# Patient Record
Sex: MALE | Race: WHITE | URBAN - METROPOLITAN AREA
[De-identification: names, ages, dates, MRNs, and addresses within clinical notes are randomized per-mention and may not be internally consistent; named-entity substitution may affect disease eponyms.]

---

## 2017-10-31 ENCOUNTER — APPOINTMENT (OUTPATIENT)
Dept: CT IMAGING | Facility: CLINIC | Age: 82
End: 2017-10-31
Attending: EMERGENCY MEDICINE
Payer: MEDICARE

## 2017-10-31 ENCOUNTER — HOSPITAL ENCOUNTER (OUTPATIENT)
Facility: CLINIC | Age: 82
Setting detail: OBSERVATION
Discharge: HOME | End: 2017-11-01
Attending: EMERGENCY MEDICINE | Admitting: INTERNAL MEDICINE
Payer: MEDICARE

## 2017-10-31 DIAGNOSIS — I48.91 ATRIAL FIBRILLATION WITH RVR (H): ICD-10-CM

## 2017-10-31 DIAGNOSIS — R55 SYNCOPE, UNSPECIFIED SYNCOPE TYPE: ICD-10-CM

## 2017-10-31 LAB
ALBUMIN SERPL-MCNC: 3.8 G/DL (ref 3.4–5)
ALP SERPL-CCNC: 62 U/L (ref 40–150)
ALT SERPL W P-5'-P-CCNC: 20 U/L (ref 0–70)
ANION GAP SERPL CALCULATED.3IONS-SCNC: 6 MMOL/L (ref 3–14)
AST SERPL W P-5'-P-CCNC: 18 U/L (ref 0–45)
BASOPHILS # BLD AUTO: 0.1 10E9/L (ref 0–0.2)
BASOPHILS NFR BLD AUTO: 0.9 %
BILIRUB SERPL-MCNC: 0.6 MG/DL (ref 0.2–1.3)
BUN SERPL-MCNC: 15 MG/DL (ref 7–30)
CALCIUM SERPL-MCNC: 8.6 MG/DL (ref 8.5–10.1)
CHLORIDE SERPL-SCNC: 96 MMOL/L (ref 94–109)
CO2 SERPL-SCNC: 29 MMOL/L (ref 20–32)
CREAT SERPL-MCNC: 0.97 MG/DL (ref 0.66–1.25)
DIFFERENTIAL METHOD BLD: NORMAL
EOSINOPHIL # BLD AUTO: 0.2 10E9/L (ref 0–0.7)
EOSINOPHIL NFR BLD AUTO: 2.2 %
ERYTHROCYTE [DISTWIDTH] IN BLOOD BY AUTOMATED COUNT: 13.4 % (ref 10–15)
GFR SERPL CREATININE-BSD FRML MDRD: 74 ML/MIN/1.7M2
GLUCOSE SERPL-MCNC: 113 MG/DL (ref 70–99)
HCT VFR BLD AUTO: 44.4 % (ref 40–53)
HGB BLD-MCNC: 15.1 G/DL (ref 13.3–17.7)
IMM GRANULOCYTES # BLD: 0.1 10E9/L (ref 0–0.4)
IMM GRANULOCYTES NFR BLD: 0.6 %
LYMPHOCYTES # BLD AUTO: 1.5 10E9/L (ref 0.8–5.3)
LYMPHOCYTES NFR BLD AUTO: 19.5 %
MAGNESIUM SERPL-MCNC: 2.5 MG/DL (ref 1.6–2.3)
MCH RBC QN AUTO: 32 PG (ref 26.5–33)
MCHC RBC AUTO-ENTMCNC: 34 G/DL (ref 31.5–36.5)
MCV RBC AUTO: 94 FL (ref 78–100)
MONOCYTES # BLD AUTO: 0.9 10E9/L (ref 0–1.3)
MONOCYTES NFR BLD AUTO: 11 %
NEUTROPHILS # BLD AUTO: 5.1 10E9/L (ref 1.6–8.3)
NEUTROPHILS NFR BLD AUTO: 65.8 %
NRBC # BLD AUTO: 0 10*3/UL
NRBC BLD AUTO-RTO: 0 /100
PLATELET # BLD AUTO: 176 10E9/L (ref 150–450)
POTASSIUM SERPL-SCNC: 4.1 MMOL/L (ref 3.4–5.3)
PROT SERPL-MCNC: 8 G/DL (ref 6.8–8.8)
RBC # BLD AUTO: 4.72 10E12/L (ref 4.4–5.9)
SODIUM SERPL-SCNC: 131 MMOL/L (ref 133–144)
TROPONIN I SERPL-MCNC: 0.04 UG/L (ref 0–0.04)
WBC # BLD AUTO: 7.7 10E9/L (ref 4–11)

## 2017-10-31 PROCEDURE — 84484 ASSAY OF TROPONIN QUANT: CPT | Performed by: EMERGENCY MEDICINE

## 2017-10-31 PROCEDURE — 96361 HYDRATE IV INFUSION ADD-ON: CPT

## 2017-10-31 PROCEDURE — 93005 ELECTROCARDIOGRAM TRACING: CPT

## 2017-10-31 PROCEDURE — 85025 COMPLETE CBC W/AUTO DIFF WBC: CPT | Performed by: EMERGENCY MEDICINE

## 2017-10-31 PROCEDURE — 25000128 H RX IP 250 OP 636: Performed by: EMERGENCY MEDICINE

## 2017-10-31 PROCEDURE — 80053 COMPREHEN METABOLIC PANEL: CPT | Performed by: EMERGENCY MEDICINE

## 2017-10-31 PROCEDURE — 70450 CT HEAD/BRAIN W/O DYE: CPT

## 2017-10-31 PROCEDURE — 25000128 H RX IP 250 OP 636: Performed by: INTERNAL MEDICINE

## 2017-10-31 PROCEDURE — 96360 HYDRATION IV INFUSION INIT: CPT

## 2017-10-31 PROCEDURE — 12000007 ZZH R&B INTERMEDIATE

## 2017-10-31 PROCEDURE — 83735 ASSAY OF MAGNESIUM: CPT | Performed by: EMERGENCY MEDICINE

## 2017-10-31 PROCEDURE — 99285 EMERGENCY DEPT VISIT HI MDM: CPT | Mod: 25

## 2017-10-31 RX ORDER — AMOXICILLIN 250 MG
2 CAPSULE ORAL 2 TIMES DAILY PRN
Status: DISCONTINUED | OUTPATIENT
Start: 2017-10-31 | End: 2017-11-01 | Stop reason: HOSPADM

## 2017-10-31 RX ORDER — SODIUM CHLORIDE 9 MG/ML
INJECTION, SOLUTION INTRAVENOUS CONTINUOUS
Status: DISCONTINUED | OUTPATIENT
Start: 2017-10-31 | End: 2017-11-01 | Stop reason: HOSPADM

## 2017-10-31 RX ORDER — ONDANSETRON 2 MG/ML
4 INJECTION INTRAMUSCULAR; INTRAVENOUS EVERY 6 HOURS PRN
Status: DISCONTINUED | OUTPATIENT
Start: 2017-10-31 | End: 2017-11-01 | Stop reason: HOSPADM

## 2017-10-31 RX ORDER — AMOXICILLIN 250 MG
1 CAPSULE ORAL 2 TIMES DAILY PRN
Status: DISCONTINUED | OUTPATIENT
Start: 2017-10-31 | End: 2017-11-01 | Stop reason: HOSPADM

## 2017-10-31 RX ORDER — ONDANSETRON 4 MG/1
4 TABLET, ORALLY DISINTEGRATING ORAL EVERY 6 HOURS PRN
Status: DISCONTINUED | OUTPATIENT
Start: 2017-10-31 | End: 2017-11-01 | Stop reason: HOSPADM

## 2017-10-31 RX ORDER — ACETAMINOPHEN 325 MG/1
650 TABLET ORAL EVERY 4 HOURS PRN
Status: DISCONTINUED | OUTPATIENT
Start: 2017-10-31 | End: 2017-11-01 | Stop reason: HOSPADM

## 2017-10-31 RX ORDER — NALOXONE HYDROCHLORIDE 0.4 MG/ML
.1-.4 INJECTION, SOLUTION INTRAMUSCULAR; INTRAVENOUS; SUBCUTANEOUS
Status: DISCONTINUED | OUTPATIENT
Start: 2017-10-31 | End: 2017-11-01 | Stop reason: HOSPADM

## 2017-10-31 RX ADMIN — SODIUM CHLORIDE 500 ML: 9 INJECTION, SOLUTION INTRAVENOUS at 21:00

## 2017-10-31 RX ADMIN — SODIUM CHLORIDE: 9 INJECTION, SOLUTION INTRAVENOUS at 23:00

## 2017-10-31 ASSESSMENT — ACTIVITIES OF DAILY LIVING (ADL)
RETIRED_COMMUNICATION: 0-->UNDERSTANDS/COMMUNICATES WITHOUT DIFFICULTY
RETIRED_EATING: 0-->INDEPENDENT
COGNITION: 0 - NO COGNITION ISSUES REPORTED
TRANSFERRING: 0-->INDEPENDENT
FALL_HISTORY_WITHIN_LAST_SIX_MONTHS: YES
SWALLOWING: 0-->SWALLOWS FOODS/LIQUIDS WITHOUT DIFFICULTY
AMBULATION: 0-->INDEPENDENT
NUMBER_OF_TIMES_PATIENT_HAS_FALLEN_WITHIN_LAST_SIX_MONTHS: 1
BATHING: 0-->INDEPENDENT
TOILETING: 0-->INDEPENDENT
DRESS: 0-->INDEPENDENT
WHICH_OF_THE_ABOVE_FUNCTIONAL_RISKS_HAD_A_RECENT_ONSET_OR_CHANGE?: AMBULATION

## 2017-10-31 ASSESSMENT — ENCOUNTER SYMPTOMS
SEIZURES: 0
SHORTNESS OF BREATH: 0
DIAPHORESIS: 1
PALPITATIONS: 0

## 2017-10-31 NOTE — IP AVS SNAPSHOT
MRN:[id number]                      After Visit Summary   10/31/2017    Sharath Valencia    MRN: [id number]           Thank you!     Thank you for choosing [facility] for your care. Our goal is always to provide you with excellent care. Hearing back from our patients is one way we can continue to improve our services. Please take a few minutes to complete the written survey that you may receive in the mail after you visit. If you would like to speak to someone directly about your visit please contact Patient Relations at [phone]. Thank you!          Patient Information     Date Of Birth          6/29/1932        Designated Caregiver       Most Recent Value    Caregiver    Will someone help with your care after discharge? yes    Name of designated caregiver brett    Phone number of caregiver [phone]    Caregiver address [place]      About your hospital stay     You were admitted on:  October 31, 2017 You last received care in the:  [place] [address] [facility]    You were discharged on:  November 1, 2017       Who to Call     For medical emergencies, please call 911.  For non-urgent questions about your medical care, please call your primary care provider or clinic, None          Attending Provider     Provider Specialty    Sepideh Arias MD Emergency Medicine    HerJoe MD Internal Medicine       Primary Care Provider Fax #    [facility] [phone]      After Care Instructions     Activity       Your activity upon discharge: activity as tolerated            Diet       Follow this diet upon discharge: regular                  Follow-up Appointments     Follow-up and recommended labs and tests        See primary MD on Friday, 11/3/17, and check INR level.  Recommend to check INR twice a week for the next 2 weeks as your Coumadin dose is adjusted.  Goal INR is 2-3.  Obtain further refills of Coumadin through your primary MD - you are only  "being given a small amount for now as I anticipate your dose will likely change    A Zio Patch has been ordered on discharge to monitor your heart rhythm for the next several weeks after discharge from the hospital.  After this is completed follow up with your primary MD to obtain the results on this                  Warfarin Instruction     You have started taking a medicine called warfarin. This is a blood-thinning medicine (anticoagulant). It helps prevent and treat blood clots.      Before leaving the hospital, make sure you know how much to take and how long to take it.      You will need regular blood tests to make sure your blood is clotting safely. It is very important to see your doctor for regular blood tests.    Talk to your doctor before taking any new medicine (this includes over-the-counter drugs and herbal products). Many medicines can interact with warfarin. This may cause more bleeding or too much clotting.     Eating a lot of vitamin K--found in green, leafy vegetables--can change the way warfarin works in your body. Do NOT avoid these foods. Instead, try to eat the same amount each day.     Bleeding is the most common side-effect of warfarin. You may notice bleeding gums, a bloody nose, bruises and bleeding longer when you cut yourself. See a doctor at once if:   o You cough up blood  o You find blood in your stool (poop)  o You have a deep cut, or a cut that bleeds longer than 10 minutes   o You have a bad cut, hard fall, accident or hit your head (go to urgent care or the emergency room).    For women who can get pregnant: This medicine can harm an unborn baby. Be very careful not to get pregnant while taking this medicine. If you think you might be pregnant, call your doctor right away.    For more information, read \"Guide to Warfarin Therapy,  the booklet you received in the hospital.        Pending Results     Date and Time Order Name Status Description    11/1/2017 1519 Zio Patch Holter In " "process             Statement of Approval     Ordered          17 1529  I have reviewed and agree with all the recommendations and orders detailed in this document.  EFFECTIVE NOW     Approved and electronically signed by:  Barrie Workman MD             Admission Information     Date & Time Provider Department Dept. Phone    10/31/2017 Joe Ramirez MD [place] [address] [facility] [phone]      Your Vitals Were     Blood Pressure Pulse Temperature Respirations Height Weight    148/83 74 95.5  F (35.3  C) (Axillary) 18 1.727 m (5' 8\") 68.9 kg (151 lb 14.4 oz)    Pulse Oximetry BMI (Body Mass Index)                100% 23.1 kg/m2          MyChart Information     [facility] lets you send messages to your doctor, view your test results, renew your prescriptions, schedule appointments and more. To sign up, go to [email or web address] . Click on \"Log in\" on the left side of the screen, which will take you to the Welcome page. Then click on \"Sign up Now\" on the right side of the page.     You will be asked to enter the access code listed below, as well as some personal information. Please follow the directions to create your username and password.     Your access code is: [email or web address]  Expires: 2018  3:36 PM     Your access code will  in 90 days. If you need help or a new code, please call your [facility] clinic or [phone].        Care EveryWhere ID     This is your Care EveryWhere ID. This could be used by other organizations to access your [facility] medical records  [id number]        Equal Access to Services     [facility]: Hadii sue todd hadmathew [place], waaxda luqadaha, qaybta kaalmada lara vences . So [facility] [phone].    ATENCIÓN: Si habla español, tiene a bills disposición servicios gratuitos de asistencia lingüística. Llame al [phone].    We comply with applicable federal civil rights laws and [place] laws. We do not discriminate on " the basis of race, color, national origin, age, disability, sex, sexual orientation, or gender identity.               Review of your medicines      START taking        Dose / Directions    metoprolol 25 MG tablet   Commonly known as:  LOPRESSOR   Used for:  Atrial fibrillation with RVR (H)        Dose:  12.5 mg   Take 0.5 tablets (12.5 mg) by mouth 2 times daily   Quantity:  60 tablet   Refills:  1       warfarin 5 MG tablet   Commonly known as:  COUMADIN   Used for:  Atrial fibrillation with RVR (H)        Dose:  5 mg   Take 1 tablet (5 mg) by mouth daily   Quantity:  4 tablet   Refills:  0            Where to get your medicines      These medications were sent to [place], [place] - [address] [facility]  [address] [facility] [address]     Phone:  [phone]     metoprolol 25 MG tablet    warfarin 5 MG tablet                Protect others around you: Learn how to safely use, store and throw away your medicines at www.disposemymeds.org.             Medication List: This is a list of all your medications and when to take them. Check marks below indicate your daily home schedule. Keep this list as a reference.      Medications           Morning Afternoon Evening Bedtime As Needed    metoprolol 25 MG tablet   Commonly known as:  LOPRESSOR   Take 0.5 tablets (12.5 mg) by mouth 2 times daily   Next Dose Due:  Tonight 11/1/2017                                      warfarin 5 MG tablet   Commonly known as:  COUMADIN   Take 1 tablet (5 mg) by mouth daily   Next Dose Due:  Today 11/1/2017                                             More Information        Possible Causes of Dizziness or Fainting    Dizziness and fainting can have many causes. Below are some examples of possible causes your healthcare provider will look to rule out.  Benign paroxysmal positional vertigo (BPPV)  BPPV results when calcium crystals inside the inner ear shift into the wrong position. BPPV causes  episodes of vertigo (a spinning sensation). Episodes most often happen when the head is moved in a certain way. This is more common in people 65 and older.   Infection or inflammation  The semicircular canals of the ear may become infected or inflamed. In this case, they can send the wrong balance signals. This can cause vertigo.  Meniere disease  Meniere disease happens when there is too much fluid in the semicircular canals. This can cause vertigo. It also can cause hearing problems and buzzing or ringing in the ears (called tinnitus). You may also have a feeling of pressure or fullness in the ear.  Syncope  Syncope is fainting that happens when the brain doesn t get enough oxygen-rich blood. It can be caused by low heart rate or low blood pressure. This is called vasovagal syncope. It can also be caused by sitting or standing up too quickly. This is called orthostatic hypotension. Syncope may also be due to a heart valve problem, an abnormal heart rhythm, or other heart problems. Dizziness can also happen from stroke, hemorrhage in the brain, or other problems in the brain. Your healthcare provider may do certain tests to rule out these conditions.  Other causes  Other causes include:    Medicines. Certain medicines can cause dizziness and even fainting. In some cases, stopping a medicine too quickly can lead to withdrawal symptoms, including dizziness and fainting.    Anxiety. Being anxious can lead to breathing changes, such as hyperventilation. These can lead to dizziness and fainting.  Additional causes for dizziness and fainting also exist. Talk to your healthcare provider for more information.     Date Last Reviewed: 10/6/2015    [phone] [employer]. [address] [place] [address], [place] [address]. All rights reserved. This information is not intended as a substitute for professional medical care. Always follow your healthcare professional's instructions.

## 2017-10-31 NOTE — IP AVS SNAPSHOT
[place] [address] [facility]    [address]    [facility] [address]    Phone:  [phone]    Fax:  [phone]                                       After Visit Summary   10/31/2017    Sharath Valencia    MRN: [id number]           After Visit Summary Signature Page     I have received my discharge instructions, and my questions have been answered. I have discussed any challenges I see with this plan with the nurse or doctor.    ..........................................................................................................................................  Patient/Patient Representative Signature      ..........................................................................................................................................  Patient Representative Print Name and Relationship to Patient    ..................................................               ................................................  Date                                            Time    ..........................................................................................................................................  Reviewed by Signature/Title    ...................................................              ..............................................  Date                                                            Time

## 2017-11-01 ENCOUNTER — APPOINTMENT (OUTPATIENT)
Dept: CARDIOLOGY | Facility: CLINIC | Age: 82
End: 2017-11-01
Attending: INTERNAL MEDICINE
Payer: MEDICARE

## 2017-11-01 VITALS
BODY MASS INDEX: 23.02 KG/M2 | SYSTOLIC BLOOD PRESSURE: 148 MMHG | WEIGHT: 151.9 LBS | OXYGEN SATURATION: 100 % | DIASTOLIC BLOOD PRESSURE: 83 MMHG | RESPIRATION RATE: 18 BRPM | HEART RATE: 74 BPM | HEIGHT: 68 IN | TEMPERATURE: 95.5 F

## 2017-11-01 LAB
INR PPP: 1.08 (ref 0.86–1.14)
INTERPRETATION ECG - MUSE: NORMAL

## 2017-11-01 PROCEDURE — 0296T ZIO PATCH HOLTER: CPT | Performed by: INTERNAL MEDICINE

## 2017-11-01 PROCEDURE — 36415 COLL VENOUS BLD VENIPUNCTURE: CPT | Performed by: INTERNAL MEDICINE

## 2017-11-01 PROCEDURE — 93306 TTE W/DOPPLER COMPLETE: CPT | Mod: 26 | Performed by: INTERNAL MEDICINE

## 2017-11-01 PROCEDURE — 99207 ZZC CDG-MDM COMPONENT: MEETS MODERATE - UP CODED: CPT | Performed by: INTERNAL MEDICINE

## 2017-11-01 PROCEDURE — G0378 HOSPITAL OBSERVATION PER HR: HCPCS

## 2017-11-01 PROCEDURE — 99220 ZZC INITIAL OBSERVATION CARE,LEVL III: CPT | Performed by: INTERNAL MEDICINE

## 2017-11-01 PROCEDURE — 25000128 H RX IP 250 OP 636: Performed by: INTERNAL MEDICINE

## 2017-11-01 PROCEDURE — 93306 TTE W/DOPPLER COMPLETE: CPT

## 2017-11-01 PROCEDURE — 85610 PROTHROMBIN TIME: CPT | Performed by: INTERNAL MEDICINE

## 2017-11-01 PROCEDURE — 0298T ZZC EXT ECG > 48HR TO 21 DAY REVIEW AND INTERPRETATN: CPT | Performed by: INTERNAL MEDICINE

## 2017-11-01 RX ORDER — WARFARIN SODIUM 5 MG/1
5 TABLET ORAL DAILY
Qty: 4 TABLET | Refills: 0 | Status: SHIPPED | OUTPATIENT
Start: 2017-11-01 | End: 2019-02-04

## 2017-11-01 RX ORDER — METOPROLOL TARTRATE 25 MG/1
12.5 TABLET, FILM COATED ORAL 2 TIMES DAILY
Qty: 60 TABLET | Refills: 1 | Status: SHIPPED | OUTPATIENT
Start: 2017-11-01 | End: 2019-02-04

## 2017-11-01 RX ADMIN — SODIUM CHLORIDE: 9 INJECTION, SOLUTION INTRAVENOUS at 11:48

## 2017-11-01 ASSESSMENT — ACTIVITIES OF DAILY LIVING (ADL): ADLS_ACUITY_SCORE: 11

## 2017-11-01 NOTE — PLAN OF CARE
Problem: Patient Care Overview  Goal: Plan of Care/Patient Progress Review  Outcome: Improving  PRIMARY DIAGNOSIS: SYNCOPE  OUTPATIENT/OBSERVATION GOALS TO BE MET BEFORE DISCHARGE:  1. Orthostatic performed: Yes:          Lying Orthostatic BP: 104/66         Sitting Orthostatic BP: 114/78         Standing Orthostatic BP: 100/65      2. Diagnostic testing complete & at baseline neurologic testing: Yes; ECHO completed     3. Cleared by consultants (if involved): N/A     4. Interpretation of cardiac rhythm per telemetry tech: a-fib with CVR     5. Tolerating adequate PO diet and medications: Yes     6. Return to near baseline physical activity or neurologic status: Yes     Vital signs stable. Denies any pain. Denies any nausea. Heart monitor continues to show a-fib. Asymptomatic thus far today. Up with SBA. Ambulated in halls x1.     Discharge Planner Nurse   Safe discharge environment identified: Yes  Barriers to discharge: Yes; No D/C orders yet       Entered by: Caden Brown 11/01/2017 12:06 PM     Please review provider order for any additional goals.   Nurse to notify provider when observation goals have been met and patient is ready for discharge.

## 2017-11-01 NOTE — PHARMACY - DISCHARGE MEDICATION RECONCILIATION AND EDUCATION
Discharge medication review for this patient is complete. Face-to-face medication education was provided by the pharmacist.  See [facility] for allergy information and immunization status.   Pharmacist assisted with medication reconciliation of discharge medications with PTA medications.    Discussed with Dr. Workman about bridging warfarin with Lovenox and Dr. Workman informed pharmacist that no bridging was necessary.     Patient was informed to STOP taking the following HOME medications:   none    Patient was informed to START taking the following NEW medications:   Metoprolol and warfarin    Patient was informed to make the following changes to prior to admission medications:  none    Patient was educated on the following for each discharge medication:   Rationale for therapy  Duration of treatment  Dosing and or monitoring drug levels  Common side effects  Importance of compliance  Drug/food interactions  Missed doses  Self monitoring parameters    Left written materials and instructions (Clinical notes from [facility]) for new medications: Yes    OUTCOMES: Patient verbalized understanding    IMPORTANT FOLLOW UP NOTES:   - INR check on Friday November 3rd.     Discharge Medication List     Review of your medicines      START taking       Dose / Directions    metoprolol 25 MG tablet   Commonly known as:  LOPRESSOR   Used for:  Atrial fibrillation with RVR (H)        Dose:  12.5 mg   Take 0.5 tablets (12.5 mg) by mouth 2 times daily   Quantity:  60 tablet   Refills:  1       warfarin 5 MG tablet   Commonly known as:  COUMADIN   Used for:  Atrial fibrillation with RVR (H)        Dose:  5 mg   Take 1 tablet (5 mg) by mouth daily   Quantity:  4 tablet   Refills:  0            Where to get your medicines      These medications were sent to [facility] [place], [place] - [address] [facility]  [address] [facility] [address]     Phone:  [phone]      metoprolol 25 MG tablet     warfarin 5 MG  tablet

## 2017-11-01 NOTE — PROGRESS NOTES
Pt feels well.  Wants to go home today      Last 24 hours Vitals signs,imaging,microbiology;laboratory results were reviewed by me in EPIC  GENERAL:  Comfortable.  PSYCH: pleasant, oriented, No acute distress.  HEART:  irreg with no edema.  LUNGS:  Clear to auscultation, normal Respiratory effort.  ABDOMEN:  Soft, no hepatosplenomegaly, normal bowel sounds.  SKIN:  Dry to touch, No rash.     Last Basic Metabolic Panel:  Lab Results   Component Value Date     10/31/2017      Lab Results   Component Value Date    POTASSIUM 4.1 10/31/2017     Lab Results   Component Value Date    CHLORIDE 96 10/31/2017     Lab Results   Component Value Date    CUCA 8.6 10/31/2017     Lab Results   Component Value Date    CO2 29 10/31/2017     Lab Results   Component Value Date    BUN 15 10/31/2017     Lab Results   Component Value Date    CR 0.97 10/31/2017     Lab Results   Component Value Date     10/31/2017          A/p:  afib - home today with metoprolol and warfarin.  Needs f/u with primary MD on Friday for INR check.  Zio patch on discharge.  Coumadin started.  At this point I think he can discharge on Coumadin without need for bridging Lovenox given  [id number] score of 2 he would be at low risk for a cardioembolic event to occur prior to INR likely becoming therapeutic within the next 5-7 days

## 2017-11-01 NOTE — H&P
[facility]  Hospitalist Admission Note  November 1, 2017  Name: Sharath Valencia    MRN: [id number]  YOB: 1932    Age: 85 year old  Date of admission: 10/31/2017  [facility] provider[facility]      Summary:  Sharath Valencia is an 85 year old male with no pmh who presents to the emergency department today accompanied by family for a syncopal event and was found to be in afib with rvr which is a new dx.    Problem list  1. Syncopal event: concerned that this is cardiogenic with no prodrome and new dx of afib with rvr. No neuro findings and back to baseline.  2. Afib with rvr, new dx: back in nsr with hr in the 70s. CHADS-Vasc 2 score is only significant for age  3. Mild Hyponatremia: appears to be chronic per family    Plan:    Observation Admission to telemetry for now    Will check an echo to look for underlying structural heart ds that could explain his syncopal event and/or increase his CHADS score    Consider a holter or event monitor prior to discharge    Depending on echo findings, can discuss strategy of stroke prophylaxis    -Additional workup plan which will include echo    All lab work and imaging data independently reviewed by myself    Prophylaxis;  Low risk/Ambulation.   Discharge: home when able    Chief Complaint:   Syncopal event   HPI  Sharath Valencia is an 85 year old male with no pmh who presents to the emergency department today accompanied by family for evaluation of loss of consciousness. At 1900 this evening the patient was standing and drying dishes when he had an unwitnessed loss of consciousness. The patient's family then found the patient in a seated position on the floor with his back against a wall. When they tried to help the patient back to his feet, he again lost consciousness for approximately ten seconds. While the patient was unconscious this second time, the family reports that his eyes rolled  "back into his head but he did not shake or have any seizure-like symptoms. With these two episodes, however, the patient's family decided to bring him here to the emergency department. Here, the patient reports that he did not have any prodromal symptoms prior to either syncopal episode but the family reports that he was diaphoretic after these episodes. He also reports that he does not think that he hit his head during the first fall and the family corroborates this as they believe that he simply slumped back against the wall. The patient denies any chest pain, shortness of breath, or palpitations prior to passing out and he currently denies any pain, recent sicknesses, or history of atrial fibrillation. The family reports that the patient's sodium runs a little low.  I did discuss the case in detail with the ED physician. In the ED, pt was noted to be in afib with rvr in the low 100-110s. No intervention other than ivf was ordered. Currently pt has no c/o and feels back at baseline.     Past Medical History:   History reviewed. No pertinent past medical history.  Past Surgical History:     Past Surgical History:   Procedure Laterality Date     BACK SURGERY      Laminectomy      HERNIA REPAIR       Social History:     Social History   Substance Use Topics     Smoking status: Former Smoker     Smokeless tobacco: Former User     Quit date: 10/31/1989     Alcohol use Not on file     Family History:  Family history reviewed. NO pertinent family history     Allergies:   Not on File  Medications:     No prescriptions prior to admission.       Review of Systems:   A Comprehensive greater than 10 system review of systems was carried out.  Pertinent positives and negatives are noted above.  Otherwise negative for contributory information.        Physical Exam:  Blood pressure 148/75, pulse 74, temperature 97.2  F (36.2  C), temperature source Oral, resp. rate 18, height 1.727 m (5' 8\"), weight 68.9 kg (151 lb 14.4 oz), SpO2 " 97 %.  Gen: Pleasant in no acute distress.  HEENT: NCAT. EOMI. PERRL.  Neck: Normal inspection. No bruit, JVD or thyromegaly.  Lungs: Normal respiratory effort. Clear to auscultation bilaterally with no crackles or wheezes.  Card: N s1s2. RRR. No M/R/G.  Peripheral pulses present and symmetric.   Skin: No rash. Warm to the touch  Extr: No edema. CMS intact  Psychiatric: Patient alert oriented ×3.  Normal affect  Neurologic: Cranial nerves II-XII are intact.  Sensation normal.  Motor strength 5/5    Data:       Recent Labs  Lab 10/31/17  2040   WBC 7.7   HGB 15.1   HCT 44.4   MCV 94          Recent Labs  Lab 10/31/17  2040   *   POTASSIUM 4.1   CHLORIDE 96   CO2 29   ANIONGAP 6   *   BUN 15   CR 0.97   GFRESTIMATED 74   GFRESTBLACK 89   CUCA 8.6   MAG 2.5*   PROTTOTAL 8.0   ALBUMIN 3.8   BILITOTAL 0.6   ALKPHOS 62   AST 18   ALT 20       Imaging:   Recent Results (from the past 24 hour(s))   Head CT w/o contrast    Narrative    CT HEAD W/O CONTRAST   10/31/2017 9:23 PM     HISTORY: syncope    TECHNIQUE: Axial images of the head without IV contrast material.  Radiation dose for this scan was reduced using automated exposure  control, adjustment of the mA and/or kV according to patient size, or  iterative reconstruction technique.    COMPARISON: None.    FINDINGS:  There is generalized atrophy of the brain.  Areas of low  attenuation are present in the white matter of the cerebral  hemispheres that are consistent with small vessel ischemic disease in  this age patient. There is no evidence of intracranial hemorrhage,  mass, acute infarct or anomaly. The visualized portions of the sinuses  and mastoids appear normal. There is no evidence of trauma.      Impression    IMPRESSION:   1. No acute pathology, no bleed, mass, or infarcts are seen.  2. Age related changes including diffuse brain atrophy.  White matter  changes consistent with small vessel ischemic disease.    MD Joe JIMENEZ MD  Pager [phone]

## 2017-11-01 NOTE — ED NOTES
[facility]  ED Nurse Handoff Report    Sharath Valencia is a 85 year old male   ED Chief complaint: Loss of Consciousness  . ED Diagnosis:   Final diagnoses:   Syncope, unspecified syncope type   Atrial fibrillation with RVR (H)     Allergies: Not on File    Code Status: Pt and family verbalized DNR status, but no record on file. Family said they would work on the paperwork.   Activity level - Baseline/Home:  Independent. Activity Level - Current:   Stand with Assist. Lift room needed: No. Bariatric: No   Needed: No   Isolation: No. Infection: Not Applicable.     Vital Signs:   Vitals:    10/31/17 2032 10/31/17 2045 10/31/17 2130   BP: (!) 140/92 127/81 143/90   Pulse: 74     Temp: 97.7  F (36.5  C)     TempSrc: Oral     SpO2: 98% 99%    Weight: 72.6 kg (160 lb)         Cardiac Rhythm:  ,      Pain level:    Patient confused: No. Patient Falls Risk: Yes.   Elimination Status: Has voided   Patient Report - Initial Complaint: Syncopal episode, Unwitnessed fall. Focused Assessment:Cognitive/Perceptual/Neuro - Cognitive/Neuro/Behavioral WDL:  WDL except; all Level Of Consciousness: alert Arousal Level: opens eyes spontaneously Orientation: oriented x 4 Headache:  (Denies) LUE Sensation: no numbness; no tingling RUE Sensation: no tingling; no numbness LLE Sensation: no numbness; no tingling RLE Sensation: no tingling; no numbness Neurological Comment: Pt had sudden syncopal episode at home from standing position. Pt remembers events before and after incident.   Pupils (CN II) - Pupil PERRLA: yes  Motor Strength - Left Upper: 5 - active movement against gravity and full resistance Right Upper: 5 - active movement against gravity and full resistance Left Lower: 5 - active movement against gravity and full resistance Right Lower: 5 - active movement against gravity and full resistance  [place] Coma Scale - Best Eye Response: 4-->(E4) spontaneous Best Motor Response: 6-->(M6) obeys commands  Best Verbal Response: 5-->(V5) oriented [place] Coma Scale Score: 15  Pt denies SOB and chest pain, cardiac monitor shows A-fib    Tests Performed: Labs, CT head, orthostatics. Abnormal Results:   Labs Ordered and Resulted from Time of ED Arrival Up to the Time of Departure from the ED   COMPREHENSIVE METABOLIC PANEL - Abnormal; Notable for the following:        Result Value    Sodium 131 (*)     Glucose 113 (*)     All other components within normal limits   MAGNESIUM - Abnormal; Notable for the following:     Magnesium 2.5 (*)     All other components within normal limits   CBC WITH PLATELETS DIFFERENTIAL   TROPONIN I   ORTHOSTATIC BLOOD PRESSURE AND PULSE     Head CT w/o contrast   Final Result   IMPRESSION:    1. No acute pathology, no bleed, mass, or infarcts are seen.   2. Age related changes including diffuse brain atrophy.  White matter   changes consistent with small vessel ischemic disease.      VINNIE CERDA MD      .   Treatments provided: IV fluids, Monitoring  Family Comments: Son at bedside  OBS brochure/video discussed/provided to patient:  N/A  ED Medications:   Medications   0.9% sodium chloride BOLUS (500 mLs Intravenous New Bag 10/31/17 2100)     Drips infusing:  No  For the majority of the shift, the patient's behavior Green. Interventions performed were NA.     Severe Sepsis OR Septic Shock Diagnosis Present: No      ED Nurse Name/Phone Number: Moses Gruber,   10:09 PM  RECEIVING UNIT ED HANDOFF REVIEW    Above ED Nurse Handoff Report was reviewed: YES  Reviewed by: Mary Tripathi on October 31, 2017 at 10:18 PM

## 2017-11-01 NOTE — ED NOTES
Pt had an unwitnessed fall around 1900 from a standing position. Son found patient sitting on the tile floor. Family says he fell into a corner into a sitting position. Pt A/O x4, [place], ABCs intact.

## 2017-11-01 NOTE — PLAN OF CARE
Problem: Patient Care Overview  Goal: Plan of Care/Patient Progress Review  Outcome: Improving  PRIMARY DIAGNOSIS: SYNCOPE  OUTPATIENT/OBSERVATION GOALS TO BE MET BEFORE DISCHARGE:  1. Orthostatic performed: Yes:          Lying Orthostatic BP: 104/66         Sitting Orthostatic BP: 114/78         Standing Orthostatic BP: 100/65      2. Diagnostic testing complete & at baseline neurologic testing: Yes     3. Cleared by consultants (if involved): Yes     4. Interpretation of cardiac rhythm per telemetry tech: a-fib with CVR     5. Tolerating adequate PO diet and medications: Yes     6. Return to near baseline physical activity or neurologic status: Yes     Discharge Planner Nurse   Safe discharge environment identified: Yes  Barriers to discharge: No       Entered by: Caden Brown 11/01/2017 3:39 PM      Telemetry monitoring has shown a-fib with CVR consistently through the day. Once D/C orders are placed, pt will be able to discharge back to [facility] with son for transportation.      Please review provider order for any additional goals.   Nurse to notify provider when observation goals have been met and patient is ready for discharge.

## 2017-11-01 NOTE — UTILIZATION REVIEW
"  Admission Status; Secondary Review Determination         Under the authority of the Utilization Management Committee, the utilization review process indicated a secondary review on the above patient.  The review outcome is based on review of the medical records, discussions with staff, and applying clinical experience noted on the date of the review.        ()      Inpatient Status Appropriate - This patient's medical care is consistent with medical management for inpatient care and reasonable inpatient medical practice.      (x) Observation Status Appropriate - This patient does not meet hospital inpatient criteria and is placed in observation status. If this patient's primary payer is Medicare and was admitted as an inpatient, Condition Code 44 should be used and patient status changed to \"observation\".   () Admission Status NOT Appropriate - This patient's medical care is not consistent with medical management for Inpatient or Observation Status.          RATIONALE FOR DETERMINATION   Sharath Valencia is an 85 year old male with no pmh who presented to the emergency department accompanied by family for evaluation of loss of consciousness. At 1900 the evening ofadmission the patient was standing and drying dishes when he had an unwitnessed loss of consciousness. The patient's family then found the patient in a seated position on the floor with his back against a wall. When they tried to help the patient back to his feet, he again lost consciousness for approximately ten seconds. While the patient was unconscious this second time, the family reports that his eyes rolled back into his head but he did not shake or have any seizure-like symptoms. With these two episodes, however, the patient's family decided to bring him to the emergency department.  The patient reported that he did not have any prodromal symptoms prior to either syncopal episode but the family reports that he was diaphoretic after these " episodes. He also reports that he does not think that he hit his head during the first fall and the family corroborates this as they believe that he simply slumped back against the wall. The patient denies any chest pain, shortness of breath, or palpitations prior to passing out and he currently denies any pain, recent sicknesses, or history of atrial fibrillation.   In the ED, pt was noted to be in afib with rvr in the low 100-110s. Labs were unremarkable other than mildly low sodium of 131 which, per family, is chronic.  CT of the head was unremarkable.  No intervention other than IVF were indicated.  Pt spontaneously converted to NSR.  He was admitted for observation, telemetry and monitoring.  I spoke with the attending physician and it is anticipated that he will discharge later today.  Observation status appropriate.      The severity of illness, intensity of service provided, expected LOS and risk for adverse outcome make the care complex, high risk and appropriate for hospital admission.        The information on this document is developed by the utilization review team in order for the business office to ensure compliance.  This only denotes the appropriateness of proper admission status and does not reflect the quality of care rendered.         The definitions of Inpatient Status and Observation Status used in making the determination above are those provided in the CMS Coverage Manual, Chapter 1 and Chapter 6, section 70.4.      Sincerely,     Kaylan Teran MD  Physician Advisor   Utilization Review/ Case Management  [facility].

## 2017-11-01 NOTE — PLAN OF CARE
Problem: Syncope (Adult)  Goal: Identify Related Risk Factors and Signs and Symptoms  Related risk factors and signs and symptoms are identified upon initiation of Human Response Clinical Practice Guideline (CPG).  Outcome: Improving  PRIMARY DIAGNOSIS: Syncope  OUTPATIENT/OBSERVATION GOALS TO BE MET BEFORE DISCHARGE:  1. ADLs back to baseline: Yes      2. Activity and level of assistance: Up with standby assistance.      3. Pain status: Denies pain      4. Return to near baseline physical activity: Yes          Discharge Planner Nurse   Safe discharge environment identified: Yes  Barriers to discharge: No  Patient alert and oriented x 4. VSS. Denies pain. A-fib on tele. Voiding well.  ECHO  Will be done this morning.       Entered by: Alie ARCE Che 11/01/2017 6:16 AM     Please review provider order for any additional goals.   Nurse to notify provider when observation goals have been met and patient is ready for discharge.

## 2017-11-01 NOTE — DISCHARGE SUMMARY
PRIMARY CARE PHYSICIAN:  Dr. Brock Alvarado.         DATE OF ADMISSION:  10/31/2017.       DATE OF DISCHARGE:  11/01/2017.       PRINCIPAL FINAL DIAGNOSES:     1.  Loss of consciousness, presumed syncope event.   2.  New diagnosis of atrial fibrillation, possibly related symptoms above.        PROCEDURES THIS ADMISSION:   1.  Echocardiogram showing normal left ventricular function with no significant valvular pathology.   2.  Telemetry monitoring showing atrial fibrillation, with heart rate primarily in the normal range while hospitalized.      REASON FOR ADMISSION:  Please see dictated history and physical by Dr. Ramirez.  In brief, Mr. Sharath Valencia is an 85-year-old male with no significant past medical history who states he has not seen a doctor for quite some time.  He presented after a loss of consciousness events and was found to be in atrial fibrillation with rapid ventricular response on presentation to the ER.      HOSPITAL COURSE:  Syncope:  Symptoms most consistent with syncope.  May have been related to rapid atrial fibrillation.  This is a new diagnosis for the patient.  He has no history of syncope in the past and denies any fall episodes.  While hospitalized, he remained in atrial fibrillation with a fairly controlled heart rate.  I did elect to start him on low-dose metoprolol on discharge to help maintain a controlled heart rate while he is a mobilizing and ambulating outside the hospital.  In regards to his risk factors for stroke, primarily age-related.  I did recommend anticoagulation for him given a CHADS2-VASc score of 2.  Both he and his son were interested in this.  I discussed with them the risks and benefits of Coumadin versus some of the newer anticoagulants, and at this point they have decided to go with Coumadin understanding the need for frequent INR monitoring and follow up with a primary care doctor.  Instructed him to arrange followup with primary care physician in 2 days for an INR  check.  INR is 1.1 today and he is starting his first dose warfarin this evening.      I have also ordered a Zio patch on discharge to get a better idea of follow fast or slow his heart rate is in atrial fibrillation.  He should follow up with his primary care physician and to get the results of this on discharge.      DISCHARGE MEDICATIONS:   1.  Metoprolol 12.5 mg twice a day.  New medication.    2.  Warfarin 5 mg daily.  New medication.  Goal INR 2-3.  INR is 1.1 on discharge from the hospital.      FOLLOWUP INSTRUCTIONS:   1.  Follow up with primary care provider in 2 days, on Friday, 2017, for INR check.  I did recommend that he check his INR at least twice a week for the next 2 weeks until the dose of warfarin is established.   2.  Zio patch ordered on discharge.  The patient should follow up with primary care physician to get the results of this when it is completed.      The patient was seen and examined on day of discharge.         MARINA MEHTA MD             D: 2017 16:55   T: 2017 17:15   MT: [id number]      Name:     LETI DAVIS   MRN:      [id number]        Account:        [id number]   :      1932           Admit Date:                                       Discharge Date:       Document: [id number]       cc: Brock Alvarado MD

## 2017-11-01 NOTE — PLAN OF CARE
Problem: Patient Care Overview  Goal: Discharge Needs Assessment  Outcome: Adequate for Discharge Date Met:  11/01/17  Patient's After Visit Summary was reviewed with patient and/or family.   Patient verbalized understanding of After Visit Summary, recommended follow up and was given an opportunity to ask questions.   Discharge medications sent home with patient/family: YES, Metoprolol and Jantoven  Discharged with son     Discharge plan reviewed with pt and family verbalized understanding. Rx medication and all personal belonging with pt. All questions answered. Discharged transport with Son.

## 2017-11-01 NOTE — ED PROVIDER NOTES
History     Chief Complaint:  Loss of Consciousness    HPI   Sharath Valencia is a non anticoagulated 85 year old male who presents to the emergency department today accompanied by family for evaluation of loss of consciousness. At 1900 this evening the patient was standing and drying dishes when he had an unwitnessed loss of consciousness. The patient's family then found the patient in a seated position on the floor with his back against a wall. When they tried to help the patient back to his feet, he again lost consciousness for approximately ten seconds. While the patient was unconscious this second time, the family reports that his eyes rolled back into his head but he did not shake or have any seizure-like symptoms. With these two episodes, however, the patient's family decided to bring him here to the emergency department. Here, the patient reports that he did not have any prodromal symptoms prior to either syncopal episode but the family reports that he was diaphoretic after these episodes. He also reports that he does not think that he hit his head during the first fall and the family corroborates this as they believe that he simply slumped back against the wall. The patient denies any chest pain, shortness of breath, or palpitations prior to passing out and he currently denies any pain, recent sicknesses, or history of atrial fibrillation. The family reports that the patient's sodium runs a little low.    Allergies:  No Known Drug Allergies     Medications:    Medications reviewed. No current medications.      Past Medical History:    History reviewed. No pertinent past medical history.    Past Surgical History:    Laminectomy  Hernia repair     Family History:    History reviewed. No pertinent family history.     Social History:  The patient was accompanied to the ED by his family.  Smoking Status: Former Smoker   Smokeless Tobacco: Former User    Review of Systems   Constitutional: Positive for  diaphoresis.   Respiratory: Negative for shortness of breath.    Cardiovascular: Negative for chest pain and palpitations.   Neurological: Positive for syncope (x 2). Negative for seizures.   All other systems reviewed and are negative.    Physical Exam     Patient Vitals for the past 24 hrs:   BP Temp Temp src Pulse Heart Rate SpO2 Weight   10/31/17 2130 143/90 - - - - - -   10/31/17 2045 127/81 - - - 110 99 % -   10/31/17 2032 (!) 140/92 97.7  F (36.5  C) Oral 74 74 98 % 72.6 kg (160 lb)     Physical Exam  Constitutional: The patient is oriented to person, place, and time. Alert and cooperative.  HENT:   Head: atraumatic.   Right Ear: External ear normal. TM normal appearing.   Left Ear: External ear normal. TM normal appearing.   Nose: Nose normal.   Mouth/Throat: Uvula is midline, oropharynx is clear and moist and mucous membranes are normal. No posterior oropharyngeal edema or erythema.   Eyes: Conjunctivae, EOM and lids are normal. Pupils are equal, round, and reactive to light.   Neck: Trachea normal. Normal range of motion. Neck supple.   Cardiovascular: tachycardia, irregularly irregular rhythm, normal heart sounds, and intact distal pulses.    Pulmonary/Chest: Effort normal and breath sounds equal bilaterally. No crackles or wheezing.   Abdominal: Soft. No tenderness. No rebound and no guarding.   Musculoskeletal: Normal range of motion.  No extremity tenderness or edema. No midline tenderness, step-offs, or deformities in the C/T/L spine.   Neurological: Alert and Oriented. Strength 5/5 in upper and lower extremities bilaterally. Sensation intact to light touch throughout.  Skin: Skin is dry. No rash noted.          Emergency Department Course     ECG:  ECG taken at 2033, ECG read at 2039  Atrial fibrillation with rapid ventricular response  Incomplete right bundle branch block   Abnormal ECG   Rate 106 bpm. CT interval * ms. QRS duration 94 ms. QT/QTc 304/403 ms. P-R-T axes * -27  -16.    Imaging:  Radiology findings were communicated with the patient who voiced understanding of the findings.    Head CT w/o contrast   1. No acute pathology, no bleed, mass, or infarcts are seen.  2. Age related changes including diffuse brain atrophy.  White matter  changes consistent with small vessel ischemic disease.  Reading per radiology    Laboratory:  Laboratory findings were communicated with the patient who voiced understanding of the findings.    CBC: WBC 7.7, HGB 15.1,   CMP:  (L), Glucose 113 (H) o/w WNL (Creatinine 0.97)  Troponin I (Collected 2040): 0.038      Interventions:  2100 NS 1000 ml IV     Emergency Department Course:    2040 Nursing notes and vitals reviewed.    2045 I performed an exam of the patient as documented above.     2057 IV was inserted and blood was drawn for laboratory testing, results above.    2117 The patient was sent for a Head CT w/o contrast while in the emergency department, results above.      2148 I discussed the treatment plan with the patient. They expressed understanding of this plan and consented to admission. I discussed the patient with Dr. Joe Ramirez, who will admit the patient to a monitored bed for further evaluation and treatment. Dr. Ramirez would not like heparin started here in the emergency department.     2151 I personally reviewed the laboratory, EKG, and imaging results with the patient and answered all related questions prior to admission.    Impression & Plan      Medical Decision Making:  Sharath Valencia is an 85 year old male who presents to the emergency department today for evaluation of following a syncopal event. Upon presentation in the emergency department the patient is non toxic appearing. He is tachycardic and mildly hypertensive but vitals are otherwise within normal limits and stable. On exam, he is well appearing. He is alert, oriented, and neurologic exam is non focal. Head is atraumatic without signs of basilar skull  fracture. Aside from tachycardia with an irregularly irregular rhythm, cardiac exam is unremarkable. His lungs are clear to auscultation bilaterally. His abdomen is soft and non tender throughout. He has full active range of motion of all extremities. The rest of his exam is as mentioned above. EKG was obtained and demonstrates atrial fibrillation with RVR. There are no concerning acute ischemic changes. Labs were obtained and are as mentioned above. CT of the head was obtained and demonstrates no evidence of an acute intracranial process and no evidence of trauma secondary to his syncopal event. Given that the patient does endorse two syncopal events without a significant prodrome, I would be concerned for a cardiac etiology. The patient is in atrial fibrillation with RVR here and denies a history of atrial fibrillation in the past. He is asymptomatic from this, therefore it is unclear how long the patient has been in atrial fibrillation, therefore he is not a candidate for cardioversion at this time. Given that he did have two syncopal events and is in atrial fibrillation with mild RVR, I do feel that admission is indicated at this time. He was discussed with the hospitalist and they agreed to accept this patient. He was stable/improved at the time of admission.     Diagnosis:    ICD-10-CM    1. Syncope, unspecified syncope type R55 Magnesium     Magnesium     CANCELED: Magnesium   2. Atrial fibrillation with RVR (H) I48.91      Disposition:   The patient is admitted into the care of Dr. Joe Ramirez of the hospitalist service.    Scribe Disclosure:  David JERRY, am serving as a scribe at 8:38 PM on 10/31/2017 to document services personally performed by Sepideh Arias MD, based on my observations and the provider's statements to me.    [facility] EMERGENCY DEPARTMENT       Sepideh Arias MD  11/01/17 3238

## 2017-11-01 NOTE — PHARMACY-ANTICOAGULATION SERVICE
Clinical Pharmacy- Warfarin Discharge Note  This patient is currently on warfarin for the treatment of Atrial fibrillation.  INR Goal= 2-3  Expected length of therapy undetermined.    Anticoagulation Dose History     Recent Dosing and Labs Latest Ref Rng & Units 11/1/2017    INR 0.86 - 1.14 1.08          Agree with discharge orders of starting at 5 mg daily and then having INR checked on Friday November 3rd.

## 2017-11-01 NOTE — PHARMACY
Anticoagulant coverage check.  Patient has prescription coverage through a employer's retiree plan with a $250 unmet deductible.    Xarelto  Nov: Upon receipt of RX, [facility] can provide 1 month free.  Dec:$279 (deductible)  [place]: $279 (deductible)  Feb-Dec: $70/month    Eliquis and Pradaxa require PA.      Jantoven= $6.60/mo    -STyson Amaya, Pharmacy Technician/Liaison, Discharge Pharmacy [phone]

## 2017-11-01 NOTE — PLAN OF CARE
Problem: Patient Care Overview  Goal: Plan of Care/Patient Progress Review  Outcome: Improving  PRIMARY DIAGNOSIS: SYNCOPE  OUTPATIENT/OBSERVATION GOALS TO BE MET BEFORE DISCHARGE:  1. Orthostatic performed: Yes; WNL last evening     2. Diagnostic testing complete & at baseline neurologic testing: No; ECHO in process now     3. Cleared by consultants (if involved): No     4. Interpretation of cardiac rhythm per telemetry tech: A-fib with CVR, HR 90s     5. Tolerating adequate PO diet and medications: Yes     6. Return to near baseline physical activity or neurologic status: Yes     Discharge Planner Nurse   Safe discharge environment identified: Yes  Barriers to discharge: Yes       Entered by: Caden Brown 11/01/2017 8:38 AM     Please review provider order for any additional goals.   Nurse to notify provider when observation goals have been met and patient is ready for discharge.

## 2017-11-03 ENCOUNTER — HOSPITAL ENCOUNTER (OUTPATIENT)
Dept: LAB | Facility: CLINIC | Age: 82
Discharge: HOME | End: 2017-11-03
Attending: FAMILY MEDICINE | Admitting: FAMILY MEDICINE
Payer: MEDICARE

## 2017-11-03 DIAGNOSIS — Z79.01 ANTICOAGULATED ON COUMADIN: Primary | ICD-10-CM

## 2017-11-03 LAB — INR PPP: 1.18 (ref 0.86–1.14)

## 2017-11-03 PROCEDURE — 36415 COLL VENOUS BLD VENIPUNCTURE: CPT | Performed by: FAMILY MEDICINE

## 2017-11-03 PROCEDURE — 85610 PROTHROMBIN TIME: CPT | Performed by: FAMILY MEDICINE

## 2019-02-04 ENCOUNTER — APPOINTMENT (OUTPATIENT)
Dept: CT IMAGING | Facility: CLINIC | Age: 84
DRG: 552 | End: 2019-02-04
Payer: MEDICARE

## 2019-02-04 ENCOUNTER — APPOINTMENT (OUTPATIENT)
Dept: GENERAL RADIOLOGY | Facility: CLINIC | Age: 84
DRG: 552 | End: 2019-02-04
Payer: MEDICARE

## 2019-02-04 ENCOUNTER — HOSPITAL ENCOUNTER (INPATIENT)
Facility: CLINIC | Age: 84
LOS: 5 days | Discharge: NURSING, REHAB OR LONG-TERM CARE | DRG: 552 | End: 2019-02-09
Attending: EMERGENCY MEDICINE | Admitting: INTERNAL MEDICINE
Payer: MEDICARE

## 2019-02-04 DIAGNOSIS — I48.20 CHRONIC ATRIAL FIBRILLATION (H): ICD-10-CM

## 2019-02-04 DIAGNOSIS — S32.010A COMPRESSION FRACTURE OF L1 LUMBAR VERTEBRA, CLOSED, INITIAL ENCOUNTER (H): Primary | ICD-10-CM

## 2019-02-04 DIAGNOSIS — I50.9 CONGESTIVE HEART FAILURE, UNSPECIFIED HF CHRONICITY, UNSPECIFIED HEART FAILURE TYPE (H): ICD-10-CM

## 2019-02-04 DIAGNOSIS — B37.0 CANDIDIASIS OF MOUTH: ICD-10-CM

## 2019-02-04 LAB
ALBUMIN UR-MCNC: 100 MG/DL
ANION GAP SERPL CALCULATED.3IONS-SCNC: 9 MMOL/L (ref 3–14)
APPEARANCE UR: CLEAR
BACTERIA #/AREA URNS HPF: ABNORMAL /HPF
BASOPHILS # BLD AUTO: 0 10E9/L (ref 0–0.2)
BASOPHILS NFR BLD AUTO: 0.3 %
BILIRUB UR QL STRIP: NEGATIVE
BUN SERPL-MCNC: 27 MG/DL (ref 7–30)
CALCIUM SERPL-MCNC: 8.8 MG/DL (ref 8.5–10.1)
CHLORIDE SERPL-SCNC: 109 MMOL/L (ref 94–109)
CK SERPL-CCNC: 657 U/L (ref 30–300)
CO2 SERPL-SCNC: 25 MMOL/L (ref 20–32)
COLOR UR AUTO: YELLOW
CREAT SERPL-MCNC: 0.83 MG/DL (ref 0.66–1.25)
DIFFERENTIAL METHOD BLD: ABNORMAL
EOSINOPHIL # BLD AUTO: 0.1 10E9/L (ref 0–0.7)
EOSINOPHIL NFR BLD AUTO: 0.4 %
ERYTHROCYTE [DISTWIDTH] IN BLOOD BY AUTOMATED COUNT: 14.3 % (ref 10–15)
FLUAV+FLUBV AG SPEC QL: NEGATIVE
FLUAV+FLUBV AG SPEC QL: NEGATIVE
GFR SERPL CREATININE-BSD FRML MDRD: 79 ML/MIN/{1.73_M2}
GLUCOSE SERPL-MCNC: 115 MG/DL (ref 70–99)
GLUCOSE UR STRIP-MCNC: >499 MG/DL
HCT VFR BLD AUTO: 36.4 % (ref 40–53)
HGB BLD-MCNC: 11.9 G/DL (ref 13.3–17.7)
HGB UR QL STRIP: NEGATIVE
IMM GRANULOCYTES # BLD: 0.1 10E9/L (ref 0–0.4)
IMM GRANULOCYTES NFR BLD: 0.8 %
INR PPP: 2.91 (ref 0.86–1.14)
KETONES UR STRIP-MCNC: 5 MG/DL
LACTATE BLD-SCNC: 1.8 MMOL/L (ref 0.7–2)
LEUKOCYTE ESTERASE UR QL STRIP: NEGATIVE
LYMPHOCYTES # BLD AUTO: 1 10E9/L (ref 0.8–5.3)
LYMPHOCYTES NFR BLD AUTO: 6.9 %
MAGNESIUM SERPL-MCNC: 2.3 MG/DL (ref 1.6–2.3)
MCH RBC QN AUTO: 31.5 PG (ref 26.5–33)
MCHC RBC AUTO-ENTMCNC: 32.7 G/DL (ref 31.5–36.5)
MCV RBC AUTO: 96 FL (ref 78–100)
MONOCYTES # BLD AUTO: 1.5 10E9/L (ref 0–1.3)
MONOCYTES NFR BLD AUTO: 10.8 %
MUCOUS THREADS #/AREA URNS LPF: PRESENT /LPF
NEUTROPHILS # BLD AUTO: 11.3 10E9/L (ref 1.6–8.3)
NEUTROPHILS NFR BLD AUTO: 80.8 %
NITRATE UR QL: NEGATIVE
NRBC # BLD AUTO: 0 10*3/UL
NRBC BLD AUTO-RTO: 0 /100
NT-PROBNP SERPL-MCNC: 7306 PG/ML (ref 0–1800)
PH UR STRIP: 5 PH (ref 5–7)
PLATELET # BLD AUTO: 116 10E9/L (ref 150–450)
POTASSIUM SERPL-SCNC: 3.6 MMOL/L (ref 3.4–5.3)
PROCALCITONIN SERPL-MCNC: 0.12 NG/ML
RBC # BLD AUTO: 3.78 10E12/L (ref 4.4–5.9)
RBC #/AREA URNS AUTO: 6 /HPF (ref 0–2)
SODIUM SERPL-SCNC: 143 MMOL/L (ref 133–144)
SOURCE: ABNORMAL
SP GR UR STRIP: 1.02 (ref 1–1.03)
SPECIMEN SOURCE: NORMAL
SQUAMOUS #/AREA URNS AUTO: <1 /HPF (ref 0–1)
TROPONIN I SERPL-MCNC: 0.1 UG/L (ref 0–0.04)
TROPONIN I SERPL-MCNC: 0.11 UG/L (ref 0–0.04)
TROPONIN I SERPL-MCNC: 0.11 UG/L (ref 0–0.04)
UROBILINOGEN UR STRIP-MCNC: 0 MG/DL (ref 0–2)
WBC # BLD AUTO: 13.9 10E9/L (ref 4–11)
WBC #/AREA URNS AUTO: 1 /HPF (ref 0–5)

## 2019-02-04 PROCEDURE — 80048 BASIC METABOLIC PNL TOTAL CA: CPT | Performed by: EMERGENCY MEDICINE

## 2019-02-04 PROCEDURE — 96374 THER/PROPH/DIAG INJ IV PUSH: CPT

## 2019-02-04 PROCEDURE — 82550 ASSAY OF CK (CPK): CPT | Performed by: EMERGENCY MEDICINE

## 2019-02-04 PROCEDURE — 81001 URINALYSIS AUTO W/SCOPE: CPT | Performed by: EMERGENCY MEDICINE

## 2019-02-04 PROCEDURE — 96361 HYDRATE IV INFUSION ADD-ON: CPT

## 2019-02-04 PROCEDURE — 84484 ASSAY OF TROPONIN QUANT: CPT | Performed by: INTERNAL MEDICINE

## 2019-02-04 PROCEDURE — 85610 PROTHROMBIN TIME: CPT | Performed by: EMERGENCY MEDICINE

## 2019-02-04 PROCEDURE — 72125 CT NECK SPINE W/O DYE: CPT

## 2019-02-04 PROCEDURE — 99223 1ST HOSP IP/OBS HIGH 75: CPT | Mod: AI | Performed by: INTERNAL MEDICINE

## 2019-02-04 PROCEDURE — 12000000 ZZH R&B MED SURG/OB

## 2019-02-04 PROCEDURE — 36415 COLL VENOUS BLD VENIPUNCTURE: CPT | Performed by: INTERNAL MEDICINE

## 2019-02-04 PROCEDURE — 83605 ASSAY OF LACTIC ACID: CPT | Performed by: INTERNAL MEDICINE

## 2019-02-04 PROCEDURE — 99285 EMERGENCY DEPT VISIT HI MDM: CPT | Mod: 25

## 2019-02-04 PROCEDURE — 25000132 ZZH RX MED GY IP 250 OP 250 PS 637: Mod: GY | Performed by: INTERNAL MEDICINE

## 2019-02-04 PROCEDURE — 84145 PROCALCITONIN (PCT): CPT | Performed by: EMERGENCY MEDICINE

## 2019-02-04 PROCEDURE — 85025 COMPLETE CBC W/AUTO DIFF WBC: CPT | Performed by: EMERGENCY MEDICINE

## 2019-02-04 PROCEDURE — 73502 X-RAY EXAM HIP UNI 2-3 VIEWS: CPT

## 2019-02-04 PROCEDURE — 72131 CT LUMBAR SPINE W/O DYE: CPT

## 2019-02-04 PROCEDURE — 84145 PROCALCITONIN (PCT): CPT | Performed by: INTERNAL MEDICINE

## 2019-02-04 PROCEDURE — 25000128 H RX IP 250 OP 636: Performed by: EMERGENCY MEDICINE

## 2019-02-04 PROCEDURE — 83880 ASSAY OF NATRIURETIC PEPTIDE: CPT | Performed by: EMERGENCY MEDICINE

## 2019-02-04 PROCEDURE — 25000132 ZZH RX MED GY IP 250 OP 250 PS 637: Mod: GY | Performed by: EMERGENCY MEDICINE

## 2019-02-04 PROCEDURE — A9270 NON-COVERED ITEM OR SERVICE: HCPCS | Mod: GY | Performed by: EMERGENCY MEDICINE

## 2019-02-04 PROCEDURE — 83735 ASSAY OF MAGNESIUM: CPT | Performed by: INTERNAL MEDICINE

## 2019-02-04 PROCEDURE — 93005 ELECTROCARDIOGRAM TRACING: CPT

## 2019-02-04 PROCEDURE — 70450 CT HEAD/BRAIN W/O DYE: CPT

## 2019-02-04 PROCEDURE — 71046 X-RAY EXAM CHEST 2 VIEWS: CPT

## 2019-02-04 PROCEDURE — A9270 NON-COVERED ITEM OR SERVICE: HCPCS | Mod: GY | Performed by: INTERNAL MEDICINE

## 2019-02-04 PROCEDURE — 87804 INFLUENZA ASSAY W/OPTIC: CPT | Performed by: EMERGENCY MEDICINE

## 2019-02-04 PROCEDURE — 84484 ASSAY OF TROPONIN QUANT: CPT | Performed by: EMERGENCY MEDICINE

## 2019-02-04 RX ORDER — AMOXICILLIN 250 MG
1 CAPSULE ORAL 2 TIMES DAILY PRN
Status: DISCONTINUED | OUTPATIENT
Start: 2019-02-04 | End: 2019-02-09 | Stop reason: HOSPADM

## 2019-02-04 RX ORDER — POTASSIUM CHLORIDE 7.45 MG/ML
10 INJECTION INTRAVENOUS
Status: DISCONTINUED | OUTPATIENT
Start: 2019-02-04 | End: 2019-02-09 | Stop reason: HOSPADM

## 2019-02-04 RX ORDER — POTASSIUM CHLORIDE 1500 MG/1
20-40 TABLET, EXTENDED RELEASE ORAL
Status: DISCONTINUED | OUTPATIENT
Start: 2019-02-04 | End: 2019-02-09 | Stop reason: HOSPADM

## 2019-02-04 RX ORDER — WARFARIN SODIUM 5 MG/1
7.5 TABLET ORAL
Status: ON HOLD | COMMUNITY
End: 2019-02-09

## 2019-02-04 RX ORDER — BISACODYL 10 MG
10 SUPPOSITORY, RECTAL RECTAL DAILY PRN
Status: DISCONTINUED | OUTPATIENT
Start: 2019-02-04 | End: 2019-02-09 | Stop reason: HOSPADM

## 2019-02-04 RX ORDER — ASPIRIN 81 MG/1
324 TABLET, CHEWABLE ORAL ONCE
Status: COMPLETED | OUTPATIENT
Start: 2019-02-04 | End: 2019-02-04

## 2019-02-04 RX ORDER — POTASSIUM CHLORIDE 1.5 G/1.58G
20-40 POWDER, FOR SOLUTION ORAL
Status: DISCONTINUED | OUTPATIENT
Start: 2019-02-04 | End: 2019-02-09 | Stop reason: HOSPADM

## 2019-02-04 RX ORDER — METOPROLOL TARTRATE 25 MG/1
25 TABLET, FILM COATED ORAL 2 TIMES DAILY
Status: DISCONTINUED | OUTPATIENT
Start: 2019-02-04 | End: 2019-02-06

## 2019-02-04 RX ORDER — ACETAMINOPHEN 325 MG/1
650 TABLET ORAL EVERY 4 HOURS PRN
Status: DISCONTINUED | OUTPATIENT
Start: 2019-02-04 | End: 2019-02-09 | Stop reason: HOSPADM

## 2019-02-04 RX ORDER — MAGNESIUM SULFATE HEPTAHYDRATE 40 MG/ML
4 INJECTION, SOLUTION INTRAVENOUS EVERY 4 HOURS PRN
Status: DISCONTINUED | OUTPATIENT
Start: 2019-02-04 | End: 2019-02-09 | Stop reason: HOSPADM

## 2019-02-04 RX ORDER — NALOXONE HYDROCHLORIDE 0.4 MG/ML
.1-.4 INJECTION, SOLUTION INTRAMUSCULAR; INTRAVENOUS; SUBCUTANEOUS
Status: DISCONTINUED | OUTPATIENT
Start: 2019-02-04 | End: 2019-02-09 | Stop reason: HOSPADM

## 2019-02-04 RX ORDER — FUROSEMIDE 10 MG/ML
20 INJECTION INTRAMUSCULAR; INTRAVENOUS 2 TIMES DAILY
Status: DISCONTINUED | OUTPATIENT
Start: 2019-02-05 | End: 2019-02-05

## 2019-02-04 RX ORDER — OXYCODONE HYDROCHLORIDE 5 MG/1
5 TABLET ORAL EVERY 6 HOURS PRN
Status: DISCONTINUED | OUTPATIENT
Start: 2019-02-04 | End: 2019-02-09 | Stop reason: HOSPADM

## 2019-02-04 RX ORDER — POTASSIUM CL/LIDO/0.9 % NACL 10MEQ/0.1L
10 INTRAVENOUS SOLUTION, PIGGYBACK (ML) INTRAVENOUS
Status: DISCONTINUED | OUTPATIENT
Start: 2019-02-04 | End: 2019-02-09 | Stop reason: HOSPADM

## 2019-02-04 RX ORDER — DOCUSATE SODIUM 100 MG/1
100 CAPSULE, LIQUID FILLED ORAL 2 TIMES DAILY
Status: DISCONTINUED | OUTPATIENT
Start: 2019-02-04 | End: 2019-02-09 | Stop reason: HOSPADM

## 2019-02-04 RX ORDER — PROCHLORPERAZINE 25 MG
12.5 SUPPOSITORY, RECTAL RECTAL EVERY 12 HOURS PRN
Status: DISCONTINUED | OUTPATIENT
Start: 2019-02-04 | End: 2019-02-09 | Stop reason: HOSPADM

## 2019-02-04 RX ORDER — POLYETHYLENE GLYCOL 3350 17 G/17G
17 POWDER, FOR SOLUTION ORAL DAILY PRN
Status: DISCONTINUED | OUTPATIENT
Start: 2019-02-04 | End: 2019-02-09 | Stop reason: HOSPADM

## 2019-02-04 RX ORDER — AMOXICILLIN 250 MG
2 CAPSULE ORAL 2 TIMES DAILY PRN
Status: DISCONTINUED | OUTPATIENT
Start: 2019-02-04 | End: 2019-02-09 | Stop reason: HOSPADM

## 2019-02-04 RX ORDER — PROCHLORPERAZINE MALEATE 5 MG
5 TABLET ORAL EVERY 6 HOURS PRN
Status: DISCONTINUED | OUTPATIENT
Start: 2019-02-04 | End: 2019-02-09 | Stop reason: HOSPADM

## 2019-02-04 RX ORDER — WARFARIN SODIUM 5 MG/1
5 TABLET ORAL
Status: ON HOLD | COMMUNITY
End: 2019-02-09

## 2019-02-04 RX ORDER — FUROSEMIDE 10 MG/ML
20 INJECTION INTRAMUSCULAR; INTRAVENOUS ONCE
Status: COMPLETED | OUTPATIENT
Start: 2019-02-04 | End: 2019-02-04

## 2019-02-04 RX ORDER — POTASSIUM CHLORIDE 29.8 MG/ML
20 INJECTION INTRAVENOUS
Status: DISCONTINUED | OUTPATIENT
Start: 2019-02-04 | End: 2019-02-09 | Stop reason: HOSPADM

## 2019-02-04 RX ORDER — LISINOPRIL 5 MG/1
5 TABLET ORAL DAILY
Status: DISCONTINUED | OUTPATIENT
Start: 2019-02-05 | End: 2019-02-06

## 2019-02-04 RX ORDER — METOPROLOL TARTRATE 25 MG/1
25 TABLET, FILM COATED ORAL 2 TIMES DAILY
Status: ON HOLD | COMMUNITY
End: 2019-02-09

## 2019-02-04 RX ADMIN — ASPIRIN 81 MG 324 MG: 81 TABLET ORAL at 16:28

## 2019-02-04 RX ADMIN — FUROSEMIDE 20 MG: 10 INJECTION, SOLUTION INTRAMUSCULAR; INTRAVENOUS at 16:31

## 2019-02-04 RX ADMIN — METOPROLOL TARTRATE 25 MG: 25 TABLET ORAL at 20:38

## 2019-02-04 RX ADMIN — SODIUM CHLORIDE 500 ML: 9 INJECTION, SOLUTION INTRAVENOUS at 13:05

## 2019-02-04 RX ADMIN — POTASSIUM CHLORIDE 20 MEQ: 1500 TABLET, EXTENDED RELEASE ORAL at 20:39

## 2019-02-04 RX ADMIN — DOCUSATE SODIUM 100 MG: 100 CAPSULE, LIQUID FILLED ORAL at 20:39

## 2019-02-04 ASSESSMENT — ENCOUNTER SYMPTOMS
BLOOD IN STOOL: 0
ANAL BLEEDING: 0
LIGHT-HEADEDNESS: 0
MYALGIAS: 0
ABDOMINAL PAIN: 0
BACK PAIN: 1
SHORTNESS OF BREATH: 0
HEADACHES: 0
DYSURIA: 0
DIZZINESS: 0
NECK PAIN: 0

## 2019-02-04 ASSESSMENT — ACTIVITIES OF DAILY LIVING (ADL): ADLS_ACUITY_SCORE: 14

## 2019-02-04 NOTE — ED NOTES
Orthostatics - Pt is not dizzy or lightheaded when transitioning but is very weak when standing.  Legs are shaky and pt cannot keep balance.  Pt has difficulty following multi-step directions. Is also unable to urinate after 2 attempts.

## 2019-02-04 NOTE — LETTER
Activity instructions     - continue to wear TLSO at all times when out of bed  - Repeat XR of lumbar spine AP and Lateral in 6 weeks.   - Return to clinic following repeat XR spine in 6 weeks   - Call the [facility] clinic for any questions or concerns during interim, at [phone]  - Seek medical attention immediately if any paresthesias, weakness, gait instability, bowel/bladder incontinence, new pain.   As per spine service instructions      Diet instructions     Follow this diet upon discharge: Orders Placed This Encounter      Combination Diet Dysphagia Diet Level 2: Mechan Altered; Honey Thickened Liquids (pre-thickened or use instant food thickener); 2 gm NA Diet       Key Recommendations:  CTS following for elevated BNP. Pt was admitted with falls and found to have an L1 fracture. He has been fitted with a TLSO brace. His BNP on admit was 7306 and he is being followed by cardiology. He is being treated with IV lasix. PT/OT are recommending TCU on discharge. Pt is having intermittent periods of confusion and is not appropriate for CHF teaching at this time. Plan for pt to discharge to_____ TCU. Recommend CHF education in clinic when patients mentation clears.        Merly Silver    AVS/Discharge Summary is the source of truth; this is a helpful guide for improved communication of patient story

## 2019-02-04 NOTE — ED NOTES
[facility]  ED Nurse Handoff Report    Sharath Valencia is a 86 year old male   ED Chief complaint: Fall  . ED Diagnosis:   Final diagnoses:   Congestive heart failure, unspecified HF chronicity, unspecified heart failure type (H)     Allergies: No Known Allergies    Code Status: Full Code  Activity level - Baseline/Home:  Independent. Activity Level - Current:   Stand with Assist. Lift room needed: No. Bariatric: No   Needed: No   Isolation: No. Infection: Not Applicable.     Vital Signs:   Vitals:    02/04/19 1306 02/04/19 1330 02/04/19 1345 02/04/19 1630   BP: 134/87 126/83  (!) 131/116   Pulse: 108 99     Resp: 18  16    Temp: 98.1  F (36.7  C)      TempSrc: Oral      SpO2: 98%  99%        Cardiac Rhythm:  ,   Cardiac  Cardiac Rhythm: Atrial fibrillation(HR 100s)  Pain level:    Patient confused: Yes. Patient Falls Risk: Yes.   Elimination Status: Has voided, has used urinal and bedside commode.  Patient Report - Initial Complaint: Pt from assisted living, staff went to check on patient today and found him naked on the floor. Last seen by son yesterday at 8pm. Urine soaked underwear and pants found scattered in room, pt states he was trying to plug in his razor, EMS states it was put away in room. Per family, pt has fallen 3 times since Thursday, old bruise to buttocks noted, bruise to right eye also. BS: 116 per EMS, denies pain but c/o feeling weak. ABC's intact, alert and oriented to baseline per family. . Focused Assessment:   Cardiac - Cardiac WDL: -WDL except  Cardiac Monitoring - EKG Monitoring: Yes Cardiac Regularity: Irregular Cardiac Rhythm: Atrial fibrillation (HR 100s) RP        13:04 Respiratory Respiratory - Respiratory WDL: -WDL except; rhythm/pattern (appearing more SOB when standing) Rhythm/Pattern, Respiratory: depth regular; pattern regular; no shortness of breath reported Breath Sounds: All Fields  Head To Toe Assessment - All Lung Fields Breath Sounds: clear;  equal bilaterally; diminished RP     13:04 Neurological Cognitive - Cognitive/Neuro/Behavioral WDL: -WDL except Level Of Consciousness: alert Orientation: disoriented to; place; time; situation Follows Commands: yes Mood/Behavior: calm; cooperative          Tests Performed: Labs, xrays. Abnormal Results:   Labs Ordered and Resulted from Time of ED Arrival Up to the Time of Departure from the ED   CBC WITH PLATELETS DIFFERENTIAL - Abnormal; Notable for the following components:       Result Value    WBC 13.9 (*)     RBC Count 3.78 (*)     Hemoglobin 11.9 (*)     Hematocrit 36.4 (*)     Platelet Count 116 (*)     Absolute Neutrophil 11.3 (*)     Absolute Monocytes 1.5 (*)     All other components within normal limits   INR - Abnormal; Notable for the following components:    INR 2.91 (*)     All other components within normal limits   BASIC METABOLIC PANEL - Abnormal; Notable for the following components:    Glucose 115 (*)     All other components within normal limits   TROPONIN I - Abnormal; Notable for the following components:    Troponin I ES 0.103 (*)     All other components within normal limits   CK TOTAL - Abnormal; Notable for the following components:    CK Total 657 (*)     All other components within normal limits   ROUTINE UA WITH MICROSCOPIC - Abnormal; Notable for the following components:    Glucose Urine >499 (*)     Ketones Urine 5 (*)     Protein Albumin Urine 100 (*)     RBC Urine 6 (*)     Bacteria Urine Few (*)     Mucous Urine Present (*)     All other components within normal limits   NT PROBNP INPATIENT   PROCALCITONIN   ORTHOSTATIC BLOOD PRESSURE AND PULSE   INFLUENZA A/B ANTIGEN     XR Chest 2 Views   Final Result   IMPRESSION: Radiographic findings concerning for CHF. Chronic   cardiomegaly and pulmonary fibrosis could have a similar appearance.      BERONICA BUCHANAN MD      XR Pelvis w Hip Right 1 View   Final Result   IMPRESSION: There is medial joint space loss at the right hip with    coxa profunda compatible with degenerative change. There are prominent   trabecular lines along the ilioischial line on the right of doubtful   significance. No acute fracture is demonstrated. No dislocation.      BERONICA BUCHANAN MD      Lumbar spine CT w/o contrast   Final Result   IMPRESSION:   1. Acute L1 vertebral body fracture without any evidence for any   retropulsion or stenosis.   2. Multilevel degenerative disc and facet disease most advanced at   L4-L5 where there is moderate bilateral neural foraminal stenosis.   3. Previous left-sided hemilaminectomy at L5.      TEETEE JOY MD      CT Head w/o Contrast   Final Result   IMPRESSION: Chronic changes. No evidence for intracranial hemorrhage   or any acute process.      TEETEE JOY MD      CT Cervical Spine w/o Contrast   Final Result   IMPRESSION: Degenerative changes. No evidence for fracture.      TEETEE JOY MD         Treatments provided: Aspirin, 500 mL IVF, 20 mg IV lasix, monitoring  Family Comments: Son at bedside, supportive  OBS brochure/video discussed/provided to patient:  N/A  ED Medications:   Medications   0.9% sodium chloride BOLUS (0 mLs Intravenous Stopped 2/4/19 1407)   aspirin (ASA) chewable tablet 324 mg (324 mg Oral Given 2/4/19 1628)   furosemide (LASIX) injection 20 mg (20 mg Intravenous Given 2/4/19 1631)     Drips infusing:  No  For the majority of the shift, the patient's behavior Green. Interventions performed were Reorientation, reassurance.     Severe Sepsis OR Septic Shock Diagnosis Present: No      ED Nurse Name/Phone Number: Danny Jeff,   4:40 PM  RECEIVING UNIT ED HANDOFF REVIEW    Above ED Nurse Handoff Report was reviewed: yes  Reviewed by: Verna Snell on February 4, 2019 at 5:49 PM

## 2019-02-04 NOTE — PHARMACY-ADMISSION MEDICATION HISTORY
Admission medication history interview status for this patient is complete. See [facility] admission navigator for allergy information, prior to admission medications and immunization status.     Medication history interview source(s):Patient and Family  Medication history resources (including written lists, pill bottles, clinic record):None  Primary pharmacy:[facility]    Changes made to PTA medication list:  Added: -  Deleted: -  Changed: warfarin    Actions taken by pharmacist (provider contacted, etc):None     Additional medication history information:None    Medication reconciliation/reorder completed by provider prior to medication history? No    Do you take OTC medications (eg tylenol, ibuprofen, fish oil, eye/ear drops, etc)? no(Y/N)    For patients on insulin therapy: no (Y/N)  Lantus/levemir/NPH/Mix 70/30 dose:   (Y/N) (see Med list for doses)   Sliding scale Novolog Y/N  If Yes, do you have a baseline novolog pre-meal dose:  units with meals  Patients eat three meals a day:   Y/N    How many episodes of hypoglycemia do you have per week: _______  How many missed doses do you have per week: ______  How many times do you check your blood glucose per day: _______  Do you have a Continuous glucose monitor (CGM)   Y/N (remind pt that not approved for hospital use)   Any Barriers to therapy - Be specific :  cost of medications, comfortable with giving injections (if applicable), comfortable and confident with current diabetes regimen: Y/N ______________      Prior to Admission medications    Medication Sig Last Dose Taking? Auth Provider   metoprolol tartrate (LOPRESSOR) 25 MG tablet Take 25 mg by mouth 2 times daily 2/4/2019 at x1 Yes Unknown, Entered By History   warfarin (COUMADIN) 5 MG tablet Take 5 mg by mouth M,T, W,Th,Sat,Sun 2/3/2019 at Unknown time Yes Unknown, Entered By History   warfarin (COUMADIN) 5 MG tablet Take 7.5 mg by mouth On Fr Past Week at Unknown time Yes Unknown, Entered By History

## 2019-02-04 NOTE — ED PROVIDER NOTES
History     Chief Complaint:  Fall    HPI   Sharath Valencia is a 86 year old male with a history of atrial fibrillation who presents with a fall. The patient reportedly fell 5 days ago and then again 3 days ago in his independent living facility. Both of these falls were thought to be mechanical in nature and the patient developed right-sided low back pain after his fall 3 days ago. Last night the patient was last seen by his son at 2000. This morning the patient was found naked on the floor by his living facility's staff with urine soaked underwear and pants on the floor. The patient reports that sometime last night he was trying to get to the bathroom when he lost his balance and fell. The patient denies feeling lightheaded, hitting his head or losing consciousness with his fall, though he was unable to get up on his own. EMS reports that the patient's blood sugar was 116 and he was in atrial fibrillation en route to the ED. The patient currently has an abrasion on his right shoulder and is uncertain when he got this. He reports having some right-sided low back pain from his other fall 3 days ago. He otherwise denies any chest or abdominal pain, shortness of breath, headache, blurry or double vision, pain in his arms, legs or neck, or any dysuria, black or bloody stool.     Allergies:  No known drug allergies     Medications:    Lopressor  Coumadin    Past Medical History:    Paroxysmal atrial fibrillation    Past Surgical History:    Laminectomy  Hernia Repair    Family History:    History reviewed. No pertinent family history.     Social History:  Smoking Status: Former Smoker  Patient presents via EMS with his daughter.  Marital Status:       Review of Systems   Eyes: Negative for visual disturbance.   Respiratory: Negative for shortness of breath.    Cardiovascular: Negative for chest pain.   Gastrointestinal: Negative for abdominal pain, anal bleeding and blood in stool.   Genitourinary:  Negative for dysuria.   Musculoskeletal: Positive for back pain. Negative for myalgias and neck pain.   Neurological: Negative for dizziness, syncope, light-headedness and headaches.   All other systems reviewed and are negative.    Physical Exam     Patient Vitals for the past 24 hrs:   BP Temp Temp src Pulse Heart Rate Resp SpO2   02/04/19 1705 -- -- -- -- -- 18 --   02/04/19 1700 (!) 151/105 -- -- 107 -- -- --   02/04/19 1630 (!) 131/116 -- -- -- -- -- --   02/04/19 1345 -- -- -- -- -- 16 99 %   02/04/19 1330 126/83 -- -- 99 -- -- --   02/04/19 1306 134/87 98.1  F (36.7  C) Oral 108 108 18 98 %     1344:  Lying Orthostatic BP: 135/102 Lying Orthostatic Pulse: 103 bpm  Sitting Orthostatic BP: 126/83 Sitting Orthostatic Pulse: 99 bpm  Standing Orthostatic BP: 116/76 Standing Orthostatic Pulse: 118 bpm    Physical Exam  Constitutional: Well developed, nontox appearance  Head: Atraumatic.  No facial instability or crepitus  Mouth/Throat: Oropharynx is clear and moist.   Neck:  no stridor, C-spine nontender, full range of motion  Eyes: no scleral icterus, PERRL, EOMI, right inferior periorbital ecchymosis without edema  Cardiovascular: Irregularly irregular rate and rhythm, 2+ bilat radial pulses  Pulmonary/Chest: nml resp effort, Clear BS bilat  Abdominal: ND, +BS, soft, NT, no rebound or guarding   Back: No T or L-spine tenderness or crepitus  : no CVA tenderness bilat  Ext: Warm, well perfused, no edema  Neurological: A&Ox3,  CNII-XII intact, nml finger to nose, 5/5 strength throughout upper and lower ext, symmetric; sensation grossly intact  Skin: Skin is warm and dry.   Psychiatric: Behavior is normal. Thought content normal.   Nursing note and vitals reviewed.    Emergency Department Course     ECG (12:54:36):  Rate 98 bpm. GA interval * ms. QRS duration 98 ms. QT/QTc 386/492 ms. P-R-T axes * 80 -35.   Atrial fibrillation.  Incomplete right bundle branch block.  T wave abnormality, consider inferior  ischemia.  Abnormal ECG.  No significant change compared to ECG dated 10/31/17.  Interpreted at 1304 by Luke Lopez MD.    Imaging:  Radiographic findings were communicated with the patient and family who voiced understanding of the findings.    XR Pelvis w Hip Right 1 View  There is medial joint space loss at the right hip with  coxa profunda compatible with degenerative change. There are prominent  trabecular lines along the ilioischial line on the right of doubtful  significance. No acute fracture is demonstrated. No dislocation.  As read by radiology.    XR Chest 2 Views  Radiographic findings concerning for CHF. Chronic  cardiomegaly and pulmonary fibrosis could have a similar appearance.  As read by radiology.    Lumbar spine CT w/o Contrast  1. Acute L1 vertebral body fracture without any evidence for any  retropulsion or stenosis.  2. Multilevel degenerative disc and facet disease most advanced at  L4-L5 where there is moderate bilateral neural foraminal stenosis.  3. Previous left-sided hemilaminectomy at L5.  As read by radiology.    CT Head w/o Contrast  Chronic changes. No evidence for intracranial hemorrhage  or any acute process.  As read by radiology.    CT Cervical Spine w/o Contrast  Degenerative changes. No evidence for fracture.  As read by radiology.    Laboratory:    1305: Troponin I: 0.103 (H)    INR: 2.91 (H)    CK total: 657 (H)    Nt probnp inpatient: 7,306 (H)    BMP: Glucose 115 (H), o/w AWNL (Creatinine 0.83)    CBC: WBC 13.9 (H), HGB 11.9 (L),  (L), RBC 3.78 (L), HCT 36.4 (L), o/w AWNL    UA: Glucose Urine >499 (A), Ketones Urine 5 (A), Protein Albumin Urine 100 (A), RBC Urine 6 (A), Bacteria Urine Few (A), Mucous Urine Present (A), o/w Negative    Procalcitonin: Pending    Interventions:    1305:  mL IV Bolus  1628: Aspirin 324 mg PO  1631: Lasix 20 mg IV    Emergency Department Course:  Past medical records, nursing notes, and vitals reviewed.  1238: I performed  an exam of the patient and obtained history, as documented above.    IV inserted and blood drawn.    The patient was sent for X-rays and CT scans while in the emergency department, findings above.    1650: Rechecked the patient, findings and plan explained to the patient, who consents to admission. Discussed the patient with Dr. Denson, who will admit the patient to a monitored bed for further observation, evaluation, and treatment.     Impression & Plan      Medical Decision Makin year old male presenting s/p fall    DDx includes hemorrhage, skull fracture, electrolyte abnormality, mechanical fall, infection such as UTI or pneumonia, subacute CVA.  Patient denies syncope and reports remembering the fall.  EKG interp as noted above.  Labs and imaging ordered as noted above.  Labs significant for elevated troponin level, elevated BNP, leukocytosis, UA inconsistent with infection.  Imaging sig for findings concerning for pulmonary edema.   Patient's workup in the emergency department is concerning for CHF he was subsequently given Lasix as noted above.  I think would be appropriate to admit him to the hospital for further evaluation and management given his generalized weakness, multiple falls on Coumadin and possible acute CHF exacerbation.  Family and patient counseled on all results, disposition and diagnosis.  Family and patient understanding and agreeable to plan. Patient admitted in stable condition.                  Diagnosis:    ICD-10-CM    1. Congestive heart failure, unspecified HF chronicity, unspecified heart failure type (H) I50.9 Nt probnp inpatient     Influenza A/B antigen     Procalcitonin     Procalcitonin     CANCELED: Procalcitonin       Disposition:  Admitted to hospital.    Discharge Medications:     Medication List      There are no discharge medications for this visit.           Pam Zhou  2019   [facility] EMERGENCY DEPARTMENT  IPam am  serving as a scribe at 12:38 PM on 2/4/2019 to document services personally performed by Luke Lopez MD based on my observations and the provider's statements to me.        Luke Lopez MD  02/05/19 0030

## 2019-02-04 NOTE — LETTER
Transition Communication Hand-off for Care Transitions to Next Level of Care Provider    Name: Sharath Valencia  : 1932  MRN #: [id number]  Primary Care Provider: [facility]     Primary Clinic: [address]  [facility] [address]  Primary Care Clinic Name: [facility]  Reason for Hospitalization:  Congestive heart failure, unspecified HF chronicity, unspecified heart failure type (H) [I50.9]  Admit Date/Time: 2019 12:28 PM  Discharge Date: 19  Payor Source: Payor: MEDICARE / Plan: MEDICARE / Product Type: Medicare /     Readmission Assessment Measure (DEB) Risk Score/category: AVERAGE           Reason for Communication Hand-off Referral: Admission diagnoses: CHF  Fragility  Difficulty understanding plan of care    Discharge Plan:       Concern for non-adherence with plan of care:   NO  Discharge Needs Assessment:  Needs      Most Recent Value   Equipment Currently Used at Home  none   Transitional Care  [facility] [phone]          Already enrolled in Tele-monitoring program and name of program:  na  Follow-up specialty is recommended: Yes    Follow-up plan:  No future appointments.    Any outstanding tests or procedures:        Radiology & Cardiology Orders     Future Labs/Procedures Complete By Expires    X-ray lumbar spine 2-3 views*  3/19/2019 (Approximate) 2019        Referrals     Future Labs/Procedures    Occupational Therapy Adult Consult     Comments:    Evaluate and treat as clinically indicated.    Reason: Deconditioning, recurrent falls    Physical Therapy Adult Consult     Comments:    Evaluate and treat as clinically indicated.    Reason: Deconditioning, recurrent falls    Speech Language Path Adult Consult     Comments:    Evaluate and treat as clinically indicated.    Reason: Dysphagia                   Activity instructions     - continue to wear TLSO at all times when out of bed  - Repeat XR of lumbar spine AP and  Lateral in 6 weeks.   - Return to clinic following repeat XR spine in 6 weeks   - Call the Spine and Brain clinic for any questions or concerns during interim, at [phone]  - Seek medical attention immediately if any paresthesias, weakness, gait instability, bowel/bladder incontinence, new pain.   As per spine service instructions      Diet instructions     Follow this diet upon discharge: Orders Placed This Encounter      Combination Diet Dysphagia Diet Level 2: Mechan Altered; Honey Thickened Liquids (pre-thickened or use instant food thickener); 2 gm NA Diet     Follow Up and recommended labs and tests    Follow up with Nursing home physician.  No follow up labs or test are needed.   As per follow-up with spine service       Follow-up and recommended labs and tests     Please follow up at the [facility] in 6 weeks with xray prior.  Please call the clinic at [phone] to schedule your appointment.             Key Recommendations:  CTS following for elevated BNP. Pt was admitted with falls and found to have an L1 fracture. He has been fitted with a TLSO brace. His BNP on admit was 7306 and he is being followed by cardiology. He is being treated with IV lasix. PT/OT are recommending TCU on discharge. Pt is having intermittent periods of confusion and is not appropriate for CHF teaching at this time. Plan for pt to discharge to [facility] TCU. Recommend CHF education in clinic when patients mentation clears.        Merly Silver    AVS/Discharge Summary is the source of truth; this is a helpful guide for improved communication of patient story

## 2019-02-04 NOTE — ED NOTES
Bed: ED37  Expected date: 2/4/19  Expected time: 12:12 PM  Means of arrival: Ambulance  Comments:  [id number]

## 2019-02-04 NOTE — H&P
[facility]  Hospitalist Admission Note  Name: Sharath Valencia    MRN: [id number]  YOB: 1932    Age: 86 year old  Date of admission: 2/4/2019  Primary care provider: [facility]    Chief Complaint:  Shortness of breath, low back pain    Sharath Valencia is a 86 year old male with PMH including atrial fibrillation on Coumadin who presents with multiple recent falls including 5 days ago, 3 days ago and then last night when he tried to get up and go to the bathroom.  Following his fall 3 days ago he developed lower back pain.  He denies losing consciousness or passing out and these were apparently felt to be mechanical falls.     Here in the emergency room he was normotensive and afebrile.  He was initially tachycardic to 108 and was noted to be in atrial fibrillation with an incomplete right bundle branch block on EKG.  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  Chest x-ray showed cardiomegaly as well as pulmonary congestion versus fibrosis.  Lab workup was notable for an elevated troponin of 0.103, therapeutic INR of 2.91 and elevated BNP of 7306.  White blood count was also elevated at 13.9.  Urine analysis was notable for glucose urea, microscopic proteinuria and a few bacteria but otherwise negative.  He was given IV Lasix 20 mg and an aspirin and I am now asked to admit him for further care.    Assessment and Plan:   1. Suspected acute congestive heart failure: His last echo from 2017 showed normal EF but is elevated BNP, elevated troponin and chest x-ray concerning for some vascular prominence all are suggestive to congestive heart failure.  He may have developed a cardiomyopathy related to his A. fib or alternately given his elevated troponin could have obstructive coronary disease.  He will be admitted for further care with echo, serial troponins and attempt at diuresis.   Denies any chest pain at this point.  Given therapeutic INR we will not start heparin at this time.  --Admit to telemetry bed under inpatient status  --Serial troponins, will need further ischemic workup as noted below.  Timing will depend on his initial clinical course.  --Check echocardiogram  --Troponin elevated but given therapeutic INR would not start heparin and it is not clear that this is ACS in any case.  --Cautious diuresis with 20 mg IV Lasix twice daily for 3 doses total ordered, rounder should reassess tomorrow and re-order if needed.  --Monitor basic metabolic panel  --Intake/output and daily weights  --consider cardiology consult  --consider cardiac rehab pending lumbar spine plan (on spinal precautions for now)    2.   Falls with low back pain found to have an acute L1 fracture: Suspect mechanical based on his description but cannot rule out syncope completely.  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  His pain seems to be actually lower than L1.  Will consult neurosurgery for further evaluation and management.  Neurologically intact.  --Neurosurgery consult  --Keep lumbar spine precautions for now  --Pain control as needed with Tylenol, low-dose oxycodone.  --Cardiac workup as above should help evaluate for potential etiologies of syncope  --Eventually will need physical and occupational therapy but will wait to order those until he is given the okay to mobilize from neurosurgery    3.   Atrial fibrillation on coumadin: Appears to have been diagnosed in 2017 during an admit for syncope.  Chronically on metoprolol and Coumadin.  INR currently therapeutic at 2.92.  EKG does show A. fib.  --Resume Coumadin, Pharm.D. to dose  --Resume home metoprolol    4.   Leukocytosis: Denies recent infectious symptoms.  Most likely stress  response.  We will recheck.  I will also check a pro-calcitonin.    5.  Elevated troponin: The patient seems to be very stoic so evaluating his symptoms is somewhat difficult.  On further discussion with his son it sounds as though his exercise capacity has certainly been diminishing and he is had a lot of dyspnea on exertion and sleeping up in a recliner but no jimmie chest pressure or chest pain.  Certainly dyspnea could be his anginal equivalent and will continue to trend troponins and check an echocardiogram.  Pending his initial clinical course here he will at least need stress testing down the line and potentially more invasive evaluation sooner.    DVT Prophylaxis: Warfarin  Code Status: DNR/DNI.  I specifically addressed this personally with the patient in the emergency room with his son present.  Discharge Dispo: admit to inpatient status.      History of Present Illness:  Sharath Valencia is a 86 year old male with PMH including atrial fibrillation on Coumadin who presents with multiple recent falls.  He fell 5 days ago and then again 3 days ago at his independent living facility.  He denies losing consciousness or passing out and these were apparently felt to be mechanical falls.  Following his fall 3 days ago he developed right-sided low back pain.  He was seen by his son last night but then this morning was found down on the ground naked with urine soaked underwear.  He describes trying to get up to go to the bathroom last night, losing his balance and falling.  He again denies losing consciousness during this fall or having a significant prodrome.  His blood sugar in the field was 116.  He reportedly was in atrial fibrillation.  He otherwise fairly broadly denies any recent illness or new symptoms.    Here in the emergency room he was normotensive and afebrile.  He was initially tachycardic to 108 and was noted to be in atrial fibrillation with an incomplete right bundle branch block on EKG.  He  underwent fairly extensive imaging workup including CT head without contrast, CT cervical spine without contrast and lumbar CT without contrast as well as x-ray imaging of his pelvis with right hip and chest x-ray.  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  Chest x-ray showed cardiomegaly as well as pulmonary congestion versus fibrosis.  Lab workup was notable for an elevated troponin of 0.103, therapeutic INR of 2.91 and elevated BNP of 7306.  White blood count was also elevated at 13.9.  Urine analysis was notable for glucose urea, microscopic proteinuria and a few bacteria but otherwise negative.  He was given IV Lasix 20 mg and an aspirin and I am now asked to admit him for further care.     Past medical history: Atrial fibrillation chronically on Coumadin    Past surgical history:  previous lumbar spinal surgery which I believe was a hemilaminectomy    Current Medications    Prescription Sig. Disp. Refills Start Date End Date Status   warfarin (COUMADIN) 5 mg tablet    Indications: Paroxysmal atrial fibrillation (HC) Take 1 tablet by mouth once daily.   0 12/15/2017   Active   metoprolol tartrate (LOPRESSOR) 25 mg tablet    Indications: Paroxysmal atrial fibrillation (HC) Take 1 tablet by mouth 2 times daily. 180 tablet   1 2019   Active   metoprolol tartrate (LOPRESSOR) 25 mg tablet    Indications: Paroxysmal atrial fibrillation (HC) Take 1 tablet by mouth 2 times daily. 180 tablet   4 2018 Discontinued     No pertinent family history per patient.  He thinks his mom  at around age 99.  He believes his father  at 60 but is unclear exactly of what.  He thinks it may have been a complication related to working as an  in a poorly ventilated area.  Denies known history of any cardiac issues.    Social History    Former smoker though he quit at some point in the 1980s.  Lives in independent senior  living apartment at [facility].  Does not drink alcohol.    Allergies:  No Known Allergies    Review of Systems:  A Comprehensive greater than 10 system review of systems was carried out.  Pertinent positives and negatives are noted above.  Otherwise negative for contributory information.     Physical Exam:  Blood pressure (!) 131/116, pulse 99, temperature 98.1  F (36.7  C), temperature source Oral, resp. rate 16, SpO2 99 %.  Wt Readings from Last 1 Encounters:   10/31/17 68.9 kg (151 lb 14.4 oz)     Exam:  General: Alert, awake, no acute distress.  HEENT: NC/AT, eyes anicteric, external occular movements intact, face symmetric.  Dentition WNL, MM moist.  Cardiac: Irregularly irregular, S1, S2.  No murmurs appreciated.  Pulmonary: Normal chest rise, normal work of breathing.  Diminished at the bases.  Abdomen: soft, non-tender, non-distended.  Bowel Sounds Present.  No guarding.  Extremities: no deformities.  Warm, well perfused.  Skin: Dry skin, no rashes or lesions noted.  Warm and Dry.  Neuro: No focal deficits noted.  Speech clear.  Diffusely weak but nonfocal.  Psych: Appropriate affect.    Data:  EKG: Atrial fibrillation with incomplete right bundle branch block  Imaging:  Recent Results (from the past 48 hour(s))   CT Head w/o Contrast    Narrative    CT SCAN OF THE HEAD WITHOUT CONTRAST   2/4/2019 2:54 PM     HISTORY: See the clinical information for interpreting provider. Fall,  on coumadin.    TECHNIQUE:  Axial images of the head and coronal reformations without  IV contrast material.  Radiation dose for this scan was reduced using  automated exposure control, adjustment of the mA and/or kV according  to patient size, or iterative reconstruction technique.    COMPARISON: 10/31/2017.    FINDINGS: Moderate cerebral atrophy is present. Moderately extensive  white matter changes are seen in both hemispheres without mass effect.  There is no evidence for intracranial hemorrhage, mass effect,  acute  infarct, or skull fracture. Visualized paranasal sinuses and mastoid  air cells are clear.      Impression    IMPRESSION: Chronic changes. No evidence for intracranial hemorrhage  or any acute process.    TEETEE JOY MD   Lumbar spine CT w/o contrast    Narrative    CT LUMBAR SPINE WITHOUT CONTRAST 2/4/2019 2:55 PM     HISTORY: Fall, pain, evaluate fracture.    TECHNIQUE: Axial images were obtained through the lumbar spine without  contrast. Coronal and sagittal reconstructions were also acquired.    COMPARISON: None.    FINDINGS: Five lumbar-type vertebral bodies are present. There is an  acute fracture involving the L1 vertebral segment with approximately  20% loss of height. There is no posterior retropulsion. No other  fractures are present. Disc space narrowing is present at L4-L5 and  L5-SI.    T12-L1: Normal.    L1-l2: Minimal disc bulging. No stenosis.    L2-L3: Broad-based disc bulge and mild facet and ligamentum flavum  hypertrophy is present but there is no significant stenosis.    L3-L4: Broad-based disc bulging and mild facet and ligamentum flavum  hypertrophy is present causing some mild bilateral neural foraminal  stenosis and mild central canal stenosis.    L4-L5: Posterior osteophyte formation and facet hypertrophy is present  causing some moderate bilateral neural foraminal stenosis but no  central canal stenosis. There has also been a left-sided  hemilaminectomy at this level.    L5-S1: Moderate facet hypertrophy and minimal disc bulging is present  causing mild central and mild bilateral neural foraminal stenosis.      Impression    IMPRESSION:  1. Acute L1 vertebral body fracture without any evidence for any  retropulsion or stenosis.  2. Multilevel degenerative disc and facet disease most advanced at  L4-L5 where there is moderate bilateral neural foraminal stenosis.  3. Previous left-sided hemilaminectomy at L5.    TEETEE JOY MD   CT Cervical Spine w/o Contrast    Narrative    CT  CERVICAL SPINE WITHOUT CONTRAST   2/4/2019 2:55 PM     HISTORY: See the Clinical Information for Interpreting Provider; fall,  evaluate fracture.     TECHNIQUE: Axial images of the cervical spine were obtained without  intravenous contrast. Multiplanar reformations were performed.  Radiation dose for this scan was reduced using automated exposure  control, adjustment of the mA and/or kV according to patient size, or  iterative reconstruction technique.     COMPARISON: None.    FINDINGS: Sagittal reconstructions demonstrate minimal retrolisthesis  of C6 on C7 of approximately 2 mm, otherwise normal posterior  alignment. There is no evidence for any acute fracture. Multilevel  degenerative disc and facet disease is present with mild or  mild-to-moderate central canal stenoses at several levels. Paraspinal  soft tissues are unremarkable except for some vascular calcifications.  There is extensive peripherally calcified pannus formation at the  atlantoaxial joint, but there is no significant stenosis.      Impression    IMPRESSION: Degenerative changes. No evidence for fracture.    TEETEE JOY MD   XR Pelvis w Hip Right 1 View    Narrative    PELVIS WITH RIGHT HIP LATERAL TWO VIEWS 2/4/2019 3:27 PM     HISTORY: Fall, tenderness, evaluate fracture.    COMPARISON: None.      Impression    IMPRESSION: There is medial joint space loss at the right hip with  coxa profunda compatible with degenerative change. There are prominent  trabecular lines along the ilioischial line on the right of doubtful  significance. No acute fracture is demonstrated. No dislocation.    BERONICA BUCHANAN MD   XR Chest 2 Views    Narrative    CHEST TWO VIEWS 2/4/2019 3:27 PM     HISTORY: Fall, evaluate trauma.    COMPARISON: None.    FINDINGS: The heart is enlarged and there is interstitial thickening.  No pneumothorax or pleural effusion. No displaced rib fracture  identified.       Impression    IMPRESSION: Radiographic findings concerning for  CHF. Chronic  cardiomegaly and pulmonary fibrosis could have a similar appearance.    BERONICA BUCHANAN MD     Labs:  Recent Labs   Lab 02/04/19  1305   WBC 13.9*   HGB 11.9*   HCT 36.4*   MCV 96   *          Lab Results   Component Value Date     02/04/2019     10/31/2017    Lab Results   Component Value Date    CHLORIDE 109 02/04/2019    CHLORIDE 96 10/31/2017    Lab Results   Component Value Date    BUN 27 02/04/2019    BUN 15 10/31/2017      Lab Results   Component Value Date    POTASSIUM 3.6 02/04/2019    POTASSIUM 4.1 10/31/2017    Lab Results   Component Value Date    CO2 25 02/04/2019    CO2 29 10/31/2017    Lab Results   Component Value Date    CR 0.83 02/04/2019    CR 0.97 10/31/2017        Recent Labs   Lab 02/04/19  1305   NTBNPI 7,306*     Recent Labs   Lab 02/04/19  1305   INR 2.91*     No results for input(s): LACT in the last 168 hours.  Recent Labs   Lab 02/04/19  1305   TROPI 0.103*         Melquiades Denson MD  Hospitalist  [facility]

## 2019-02-04 NOTE — ED TRIAGE NOTES
Pt from assisted living, staff went to check on patient today and found him naked on the floor. Last seen by son yesterday at 8pm. Urine soaked underwear and pants found scattered in room, pt states he was trying to plug in his razor, EMS states it was put away in room. Per family, pt has fallen 3 times since Thursday, old bruise to buttocks noted, bruise to right eye also. BS: 116 per EMS, denies pain but c/o feeling weak. ABC's intact, alert and oriented to baseline per family.

## 2019-02-05 LAB
ANION GAP SERPL CALCULATED.3IONS-SCNC: 8 MMOL/L (ref 3–14)
BASOPHILS # BLD AUTO: 0.1 10E9/L (ref 0–0.2)
BASOPHILS NFR BLD AUTO: 0.4 %
BUN SERPL-MCNC: 28 MG/DL (ref 7–30)
CALCIUM SERPL-MCNC: 8.6 MG/DL (ref 8.5–10.1)
CHLORIDE SERPL-SCNC: 110 MMOL/L (ref 94–109)
CO2 SERPL-SCNC: 24 MMOL/L (ref 20–32)
CREAT SERPL-MCNC: 0.8 MG/DL (ref 0.66–1.25)
DIFFERENTIAL METHOD BLD: ABNORMAL
EOSINOPHIL # BLD AUTO: 0 10E9/L (ref 0–0.7)
EOSINOPHIL NFR BLD AUTO: 0.1 %
ERYTHROCYTE [DISTWIDTH] IN BLOOD BY AUTOMATED COUNT: 14.4 % (ref 10–15)
GFR SERPL CREATININE-BSD FRML MDRD: 80 ML/MIN/{1.73_M2}
GLUCOSE SERPL-MCNC: 108 MG/DL (ref 70–99)
HCT VFR BLD AUTO: 36.3 % (ref 40–53)
HGB BLD-MCNC: 11.7 G/DL (ref 13.3–17.7)
IMM GRANULOCYTES # BLD: 0.1 10E9/L (ref 0–0.4)
IMM GRANULOCYTES NFR BLD: 0.8 %
INR PPP: 4.05 (ref 0.86–1.14)
INTERPRETATION ECG - MUSE: NORMAL
LYMPHOCYTES # BLD AUTO: 0.8 10E9/L (ref 0.8–5.3)
LYMPHOCYTES NFR BLD AUTO: 5.9 %
MCH RBC QN AUTO: 31.2 PG (ref 26.5–33)
MCHC RBC AUTO-ENTMCNC: 32.2 G/DL (ref 31.5–36.5)
MCV RBC AUTO: 97 FL (ref 78–100)
MONOCYTES # BLD AUTO: 1.4 10E9/L (ref 0–1.3)
MONOCYTES NFR BLD AUTO: 10.8 %
NEUTROPHILS # BLD AUTO: 10.8 10E9/L (ref 1.6–8.3)
NEUTROPHILS NFR BLD AUTO: 82 %
NRBC # BLD AUTO: 0 10*3/UL
NRBC BLD AUTO-RTO: 0 /100
PLATELET # BLD AUTO: 105 10E9/L (ref 150–450)
POTASSIUM SERPL-SCNC: 3.7 MMOL/L (ref 3.4–5.3)
RBC # BLD AUTO: 3.75 10E12/L (ref 4.4–5.9)
SODIUM SERPL-SCNC: 142 MMOL/L (ref 133–144)
TROPONIN I SERPL-MCNC: 0.1 UG/L (ref 0–0.04)
WBC # BLD AUTO: 13.2 10E9/L (ref 4–11)

## 2019-02-05 PROCEDURE — 80048 BASIC METABOLIC PNL TOTAL CA: CPT | Performed by: INTERNAL MEDICINE

## 2019-02-05 PROCEDURE — L0464 TLSO 4MOD SACRO-SCAP PRE: HCPCS

## 2019-02-05 PROCEDURE — 99222 1ST HOSP IP/OBS MODERATE 55: CPT | Performed by: INTERNAL MEDICINE

## 2019-02-05 PROCEDURE — 99221 1ST HOSP IP/OBS SF/LOW 40: CPT | Performed by: PHYSICIAN ASSISTANT

## 2019-02-05 PROCEDURE — 84484 ASSAY OF TROPONIN QUANT: CPT | Performed by: INTERNAL MEDICINE

## 2019-02-05 PROCEDURE — 25000128 H RX IP 250 OP 636: Performed by: INTERNAL MEDICINE

## 2019-02-05 PROCEDURE — 36415 COLL VENOUS BLD VENIPUNCTURE: CPT | Performed by: INTERNAL MEDICINE

## 2019-02-05 PROCEDURE — 25000132 ZZH RX MED GY IP 250 OP 250 PS 637: Mod: GY | Performed by: INTERNAL MEDICINE

## 2019-02-05 PROCEDURE — A9270 NON-COVERED ITEM OR SERVICE: HCPCS | Mod: GY | Performed by: INTERNAL MEDICINE

## 2019-02-05 PROCEDURE — 85025 COMPLETE CBC W/AUTO DIFF WBC: CPT | Performed by: INTERNAL MEDICINE

## 2019-02-05 PROCEDURE — 85610 PROTHROMBIN TIME: CPT | Performed by: INTERNAL MEDICINE

## 2019-02-05 PROCEDURE — 12000000 ZZH R&B MED SURG/OB

## 2019-02-05 PROCEDURE — 99233 SBSQ HOSP IP/OBS HIGH 50: CPT | Performed by: HOSPITALIST

## 2019-02-05 RX ORDER — FUROSEMIDE 10 MG/ML
20 INJECTION INTRAMUSCULAR; INTRAVENOUS
Status: DISCONTINUED | OUTPATIENT
Start: 2019-02-05 | End: 2019-02-05

## 2019-02-05 RX ADMIN — DOCUSATE SODIUM 100 MG: 100 CAPSULE, LIQUID FILLED ORAL at 22:40

## 2019-02-05 RX ADMIN — LISINOPRIL 5 MG: 5 TABLET ORAL at 08:11

## 2019-02-05 RX ADMIN — Medication 1 MG: at 22:40

## 2019-02-05 RX ADMIN — METOPROLOL TARTRATE 25 MG: 25 TABLET ORAL at 18:40

## 2019-02-05 RX ADMIN — METOPROLOL TARTRATE 25 MG: 25 TABLET ORAL at 08:11

## 2019-02-05 RX ADMIN — FUROSEMIDE 20 MG: 10 INJECTION, SOLUTION INTRAMUSCULAR; INTRAVENOUS at 08:12

## 2019-02-05 RX ADMIN — POTASSIUM CHLORIDE 20 MEQ: 1500 TABLET, EXTENDED RELEASE ORAL at 09:38

## 2019-02-05 RX ADMIN — DOCUSATE SODIUM 100 MG: 100 CAPSULE, LIQUID FILLED ORAL at 08:11

## 2019-02-05 ASSESSMENT — ACTIVITIES OF DAILY LIVING (ADL)
ADLS_ACUITY_SCORE: 36
ADLS_ACUITY_SCORE: 35
ADLS_ACUITY_SCORE: 36
ADLS_ACUITY_SCORE: 37
ADLS_ACUITY_SCORE: 36
ADLS_ACUITY_SCORE: 37

## 2019-02-05 ASSESSMENT — MIFFLIN-ST. JEOR: SCORE: 1371.18

## 2019-02-05 NOTE — PHARMACY-ANTICOAGULATION SERVICE
Clinical Pharmacy - Warfarin Dosing Consult     Pharmacy has been consulted to manage this patient s warfarin therapy.  Indication: Atrial Fibrillation  Therapy Goal: INR 2-3  Warfarin Prior to Admission: Yes  Warfarin PTA Regimen: 7.5 mg on Fri & 5 mg ROW  Recent documented change in oral intake/nutrition: Unknown  Dose Comments: patient may have taken 10 mg on 2/3/18 according to son    INR   Date Value Ref Range Status   02/04/2019 2.91 (H) 0.86 - 1.14 Final   11/03/2017 1.18 (H) 0.86 - 1.14 Final       Recommend warfarin 0 mg today.  Pharmacy will monitor Sharath Valencia daily and order warfarin doses to achieve specified goal.      Please contact pharmacy as soon as possible if the warfarin needs to be held for a procedure or if the warfarin goals change.

## 2019-02-05 NOTE — CONSULTS
[facility]    Neurosurgery Consultation     Date of Admission:  2/4/2019  Date of Consult (When I saw the patient): 02/05/19    Assessment & Plan   Sharath Valencia is a 86 year old male who was admitted on 2/4/2019. I was asked to see the patient for acute lumbar compression fracture.    Active Problems:    Acute congestive heart failure (H)    Assessment: acute L1 compression fracture. Neuro intact.    Plan:   -Orthotics consult for TLSO brace  -OK to be OOB and work with therapies  - continue to wear TLSO at all times when out of bed  - Repeat XR of lumbar spine AP and Lateral in 6 weeks.   - Return to clinic following repeat XR spine in 6 weeks   - Call the [facility] for any questions or concerns during interim, at [phone]  - Seek medical attention immediately if any paresthesias, weakness, gait instability, bowel/bladder incontinence, new pain.          I have discussed the following assessment and plan with Dr. Tucker Lamas who is in agreement with initial plan and will follow up with further consultation recommendations.    Any Mckeon PA-C  [facility] [place] [address]    Tel [phone]  Pager [phone]        Code Status    DNR/DNI    Reason for Consult   Reason for consult: I was asked by Jesus Denson MD to evaluate this patient for acute lumbar compression fracture.    Primary Care Physician   [facility]    Chief Complaint   Back pain    History is obtained from the patient and electronic health record    History of Present Illness   Sharath Valencia is a 86 year old male who presents with acute L1 compression fracture s/p falls. Currently lying in bed and states his low back is feeling much better. Pain localized to low back without radicular symptoms, paresthesias, or weakness.    Past Medical History   I have reviewed this patient's medical history and  updated it with pertinent information if needed.   History reviewed. No pertinent past medical history.    Past Surgical History   I have reviewed this patient's surgical history and updated it with pertinent information if needed.  Past Surgical History:   Procedure Laterality Date     BACK SURGERY      Laminectomy      HERNIA REPAIR         Prior to Admission Medications   Prior to Admission Medications   Prescriptions Last Dose Informant Patient Reported? Taking?   metoprolol tartrate (LOPRESSOR) 25 MG tablet 2/4/2019 at x1  Yes Yes   Sig: Take 25 mg by mouth 2 times daily   warfarin (COUMADIN) 5 MG tablet 2/3/2019 at Unknown time Son Yes Yes   Sig: Take 5 mg by mouth M,T, W,Th,Sat,Sun   warfarin (COUMADIN) 5 MG tablet Past Week at Unknown time  Yes Yes   Sig: Take 7.5 mg by mouth On Fr      Facility-Administered Medications: None     Allergies   No Known Allergies    Social History   I have reviewed this patient's social history and updated it with pertinent information if needed. Sharath Stevens Aidencyrus  reports that he has quit smoking. He quit smokeless tobacco use about 29 years ago.    Family History   I have reviewed this patient's family history and updated it with pertinent information if needed.   No family history on file.    Review of Systems   CONSTITUTIONAL: NEGATIVE for fever, chills, change in weight  INTEGUMENTARY/SKIN: NEGATIVE for worrisome rashes, moles or lesions  EYES: NEGATIVE for vision changes or irritation  ENT/MOUTH: NEGATIVE for ear, mouth and throat problems  RESP: NEGATIVE for significant cough or SOB  BREAST: NEGATIVE for masses, tenderness or discharge  CV: NEGATIVE for chest pain, palpitations or peripheral edema  GI: NEGATIVE for nausea, abdominal pain, heartburn, or change in bowel habits  : NEGATIVE for frequency, dysuria, or hematuria  MUSCULOSKELETAL: NEGATIVE for significant arthralgias or myalgia  NEURO: NEGATIVE for weakness, dizziness or paresthesias  ENDOCRINE: NEGATIVE  for temperature intolerance, skin/hair changes  HEME: NEGATIVE for bleeding problems  PSYCHIATRIC: NEGATIVE for changes in mood or affect    Physical Exam   Temp: 98.5  F (36.9  C) Temp src: Oral BP: (!) 149/95 Pulse: 102 Heart Rate: 122 Resp: 18 SpO2: 96 % O2 Device: Nasal cannula Oxygen Delivery: 1.5 LPM  Vital Signs with Ranges  Temp:  [97.5  F (36.4  C)-98.7  F (37.1  C)] 98.5  F (36.9  C)  Pulse:  [] 102  Heart Rate:  [] 122  Resp:  [16-25] 18  BP: (104-151)/() 149/95  SpO2:  [90 %-99 %] 96 %  0 lbs 0 oz    Heart Rate: 122, Blood pressure (!) 149/95, pulse 102, temperature 98.5  F (36.9  C), temperature source Oral, resp. rate 18, SpO2 96 %.  0 lbs 0 oz  HEENT:  Normocephalic, atraumatic.  PERRLA.  EOM s intact.   Neck:  Supple, non-tender, without lymphadenopathy.  Heart:  No peripheral edema  Lungs:  No SOB  Abdomen:  Soft, non-tender, non-distended.   Skin:  Warm and dry, good capillary refill.  Extremities:  Good radial and dorsalis pedis pulses bilaterally, no edema, cyanosis or clubbing.    NEUROLOGICAL EXAMINATION:   Mental status:  Alert and Oriented x 3, speech is fluent.  Cranial nerves:  II-XII intact.   Motor:     Hip Flexor:                Right: 5/5  Left:  5/5  Hip Adductor:             Right:  5/5  Left:  5/5  Hip Abductor:             Right:  5/5  Left:  5/5  Gastroc Soleus:        Right:  5/5  Left:  5/5  Tib/Ant:                      Right:  5/5  Left:  5/5  EHL:                     Right:  5/5  Left:  5/5  Sensation:  intact  Reflexes:   Negative Babinski.  Negative Clonus.    Gait:  Deferred.    Lumbar examination reveals mild tenderness of the midline      Data   CBC RESULTS:   Recent Labs   Lab Test 02/05/19  0628   WBC 13.2*   RBC 3.75*   HGB 11.7*   HCT 36.3*   MCV 97   MCH 31.2   MCHC 32.2   RDW 14.4   *     Basic Metabolic Panel:  Lab Results   Component Value Date     02/05/2019      Lab Results   Component Value Date    POTASSIUM 3.7 02/05/2019     Lab  Results   Component Value Date    CHLORIDE 110 02/05/2019     Lab Results   Component Value Date    CUCA 8.6 02/05/2019     Lab Results   Component Value Date    CO2 24 02/05/2019     Lab Results   Component Value Date    BUN 28 02/05/2019     Lab Results   Component Value Date    CR 0.80 02/05/2019     Lab Results   Component Value Date     02/05/2019     INR:  Lab Results   Component Value Date    INR 4.05 02/05/2019    INR 2.91 02/04/2019    INR 1.18 11/03/2017    INR 1.08 11/01/2017

## 2019-02-05 NOTE — PLAN OF CARE
18:30  Pt arrived to [facility] from [facility].  Assisted into bed and oriented to room.  Continue plan of care.    22:40  Heart Failure Care Pathway  GOALS TO BE MET BEFORE DISCHARGE:    1. Decrease congestion and/or edema with diuretic therapy to achieve near      optimal volume status.            Dyspnea improved:  No            Edema improved:    NA.  No edema        Net I/O and Weights since admission:          01/05 2300 - 02/04 2259  In: -   Out: 425 [Urine:425]  Net: -425          There were no vitals filed for this visit.    2.  O2 sats > 92% on RA or at prior home O2 therapy level.          Current oxygenation status:       SpO2: 96 %         O2 Device: Nasal cannula,  Oxygen Delivery: 1 LPM         Able to wean O2 this shift to keep sats > 92%:  No.  Continues at 1L    3.  Tolerates ambulation and mobility near baseline: No.  Spinal precautions.  Bedrest        How many times did the patient ambulate with nursing staff this shift? 0    Please review the Heart Failure Care Pathway for additional HF goal outcomes.    Verna Snell RN  2/4/2019

## 2019-02-05 NOTE — PLAN OF CARE
Heart Failure Care Pathway  GOALS TO BE MET BEFORE DISCHARGE:    1. Decrease congestion and/or edema with diuretic therapy to achieve near      optimal volume status.            Dyspnea improved:  No             Edema improved:     NA        Net I/O and Weights since admission:          01/06 1500 - 02/05 1459  In: -   Out: 525 [Urine:525]  Net: -525          There were no vitals filed for this visit.    2.  O2 sats > 92% on RA or at prior home O2 therapy level.          Current oxygenation status:       SpO2: 96 %         O2 Device: Nasal cannula,  Oxygen Delivery: 1 LPM         Able to wean O2 this shift to keep sats > 92%:  No, please explain: pt needing 02       3.  Tolerates ambulation and mobility near baseline: No, please explain: Bedrest at this  time         Spinal precautions maintained. VSS. Tele Afib RVR one teen's to 120's. Will continue to monitor.    Please review the Heart Failure Care Pathway for additional HF goal outcomes.    NAIN LORENZO RN  2/5/2019

## 2019-02-05 NOTE — PROGRESS NOTES
Fit patient with tlso, off the shelf.  Patient felt immediate relief.  Patient to follow prn.  Saúl LOUIS.

## 2019-02-05 NOTE — PROGRESS NOTES
[facility]    Hospitalist Progress Note  Name: Sharath Valencia    MRN: [id number]  Provider:  David Barcenas DO MPH  Date of Service: 02/05/2019    Summary of Stay: Sharath Valencia is a 86 year old male with PMH including atrial fibrillation on Coumadin who presents with multiple recent falls including 5 days ago, 3 days ago and then last night when he tried to get up and go to the bathroom.  Following his fall 3 days ago he developed lower back pain.  He denies losing consciousness or passing out and these were apparently felt to be mechanical falls.      In the emergency room he was normotensive and afebrile.  He was initially tachycardic to 108 and was noted to be in atrial fibrillation with an incomplete right bundle branch block on EKG.  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  Chest x-ray showed cardiomegaly as well as pulmonary congestion versus fibrosis.  Lab workup was notable for an elevated troponin of 0.103, therapeutic INR of 2.91 and elevated BNP of 7306.  White blood count was also elevated at 13.9.  Urine analysis was notable for glucose urea, microscopic proteinuria and a few bacteria but otherwise negative.  He was given IV Lasix 20 mg and an aspirin and admitted for further management.     Assessment and Plan:   1.  Suspected acute congestive heart failure: His last echo from 2017 showed normal EF but is elevated BNP, elevated troponin and chest x-ray concerning for some vascular prominence all are suggestive to congestive heart failure.  He may have developed a cardiomyopathy related to his A. fib and inadequate rate control or alternately given his elevated troponin could have obstructive coronary disease.  He will be admitted for further care with echo, serial troponins and attempt at diuresis.  Denies any chest pain at this point.  Given therapeutic INR we will not start heparin at this  time.  --serial troponins relatively stable  --check echocardiogram  --Hold on heparin given therapeutic INR from warfarin  --continue cautious diuresis with low-dose Lasix  --intake/output and daily weights  --cardiology consult     2.   Falls with low back pain found to have an acute L1 fracture: Suspect mechanical based on his description but cannot rule out syncope completely.  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  His pain seems to be actually lower than L1.  Will consult neurosurgery for further evaluation and management.  He is neurologically intact.  Will be fitted for a TLSO brace.  Further recommendations per neurosurgery.     3.  Atrial fibrillation on coumadin: Appears to have been diagnosed in 2017 during an admit for syncope.  Chronically on metoprolol and Coumadin.  INR currently therapeutic at 2.92.  EKG does show A. Fib.  Heart rate seems above goal at this time.  --Resume Coumadin, Pharm.D. to dose  --Resume home metoprolol  --Cardiology consult to determine if additional rate agents necessary     4.  Leukocytosis: Denies recent infectious symptoms.  Most likely stress response.  Trending down.  I will also check a procalcitonin low.     5.  Elevated troponin: It sounds as though his exercise capacity has certainly been diminishing and he is had a lot of dyspnea on exertion and sleeping up in a recliner but no jimmie chest pressure or chest pain.  Certainly dyspnea could be his anginal equivalent and will continue to trend troponins and check an echocardiogram.  Will request cardiology consult to determine if ischemic evaluation is necessary.    DVT Prophylaxis: Warfarin  Code Status: DNR/DNI  Disposition: Expected discharge in 2-3 days to home. Goals prior to discharge include cardiac work up and treatment.   Incidental  Findings: None.  Family updated today: No     Interval History   Assumed care from previous hospitalist. The history was fully reviewed.  The patient reports doing well. No chest pain or shortness of breath. No nausea, vomiting, diarrhea, constipation. No fevers. No other specific complaints identified.     -Data reviewed today: I personally reviewed all new labs and imaging results over the last 24 hours.     Physical Exam   Temp: 98.5  F (36.9  C) Temp src: Oral BP: (!) 149/95 Pulse: 102 Heart Rate: 122 Resp: 18 SpO2: 96 % O2 Device: Nasal cannula Oxygen Delivery: 1.5 LPM  Vitals:    02/05/19 1032   Weight: 71.7 kg (158 lb)     Vital Signs with Ranges  Temp:  [97.5  F (36.4  C)-98.7  F (37.1  C)] 98.5  F (36.9  C)  Pulse:  [] 102  Heart Rate:  [] 122  Resp:  [16-25] 18  BP: (104-151)/() 149/95  SpO2:  [90 %-99 %] 96 %  I/O last 3 completed shifts:  In: -   Out: 550 [Urine:550]    GENERAL: No apparent distress. Awake, alert, and fully oriented.  HEENT: Normocephalic, atraumatic. Extraocular movements intact.  CARDIOVASCULAR: Tachy and IRR IRR without murmurs or rubs. No S3.  PULMONARY: Clear bilaterally.  GASTROINTESTINAL: Soft, non-tender, non-distended. Bowel sounds normoactive.   EXTREMITIES: No cyanosis or clubbing. No edema.  NEUROLOGICAL: CN 2-12 grossly intact, no focal neurological deficits.  DERMATOLOGICAL: No rash, ulcer, bruising, nor jaundice.     Medications     - MEDICATION INSTRUCTIONS -       Warfarin Therapy Reminder         docusate sodium  100 mg Oral BID     furosemide  20 mg Intravenous BID     lisinopril  5 mg Oral Daily     metoprolol tartrate  25 mg Oral BID     warfarin-No DOSE today  1 each Does not apply no dose today (warfarin)     Data     Laboratory:  Recent Labs   Lab 02/05/19 0628 02/04/19  1305   WBC 13.2* 13.9*   HGB 11.7* 11.9*   HCT 36.3* 36.4*   MCV 97 96   * 116*     Recent Labs   Lab 02/05/19 0628 02/04/19  1305    143   POTASSIUM 3.7 3.6    CHLORIDE 110* 109   CO2 24 25   ANIONGAP 8 9   * 115*   BUN 28 27   CR 0.80 0.83   GFRESTIMATED 80 79   GFRESTBLACK >90 >90   CUCA 8.6 8.8     Recent Labs   Lab 02/04/19  1305   NTBNPI 7,306*     Recent Labs   Lab 02/05/19  0243 02/04/19  2230 02/04/19  1850   TROPI 0.096* 0.106* 0.113*     No results for input(s): CULT in the last 168 hours.    Imaging:  Recent Results (from the past 24 hour(s))   CT Head w/o Contrast    Narrative    CT SCAN OF THE HEAD WITHOUT CONTRAST   2/4/2019 2:54 PM     HISTORY: See the clinical information for interpreting provider. Fall,  on coumadin.    TECHNIQUE:  Axial images of the head and coronal reformations without  IV contrast material.  Radiation dose for this scan was reduced using  automated exposure control, adjustment of the mA and/or kV according  to patient size, or iterative reconstruction technique.    COMPARISON: 10/31/2017.    FINDINGS: Moderate cerebral atrophy is present. Moderately extensive  white matter changes are seen in both hemispheres without mass effect.  There is no evidence for intracranial hemorrhage, mass effect, acute  infarct, or skull fracture. Visualized paranasal sinuses and mastoid  air cells are clear.      Impression    IMPRESSION: Chronic changes. No evidence for intracranial hemorrhage  or any acute process.    TEETEE JOY MD   Lumbar spine CT w/o contrast    Narrative    CT LUMBAR SPINE WITHOUT CONTRAST 2/4/2019 2:55 PM     HISTORY: Fall, pain, evaluate fracture.    TECHNIQUE: Axial images were obtained through the lumbar spine without  contrast. Coronal and sagittal reconstructions were also acquired.    COMPARISON: None.    FINDINGS: Five lumbar-type vertebral bodies are present. There is an  acute fracture involving the L1 vertebral segment with approximately  20% loss of height. There is no posterior retropulsion. No other  fractures are present. Disc space narrowing is present at L4-L5 and  L5-SI.    T12-L1: Normal.    L1-l2:  Minimal disc bulging. No stenosis.    L2-L3: Broad-based disc bulge and mild facet and ligamentum flavum  hypertrophy is present but there is no significant stenosis.    L3-L4: Broad-based disc bulging and mild facet and ligamentum flavum  hypertrophy is present causing some mild bilateral neural foraminal  stenosis and mild central canal stenosis.    L4-L5: Posterior osteophyte formation and facet hypertrophy is present  causing some moderate bilateral neural foraminal stenosis but no  central canal stenosis. There has also been a left-sided  hemilaminectomy at this level.    L5-S1: Moderate facet hypertrophy and minimal disc bulging is present  causing mild central and mild bilateral neural foraminal stenosis.      Impression    IMPRESSION:  1. Acute L1 vertebral body fracture without any evidence for any  retropulsion or stenosis.  2. Multilevel degenerative disc and facet disease most advanced at  L4-L5 where there is moderate bilateral neural foraminal stenosis.  3. Previous left-sided hemilaminectomy at L5.    TEETEE JOY MD   CT Cervical Spine w/o Contrast    Narrative    CT CERVICAL SPINE WITHOUT CONTRAST   2/4/2019 2:55 PM     HISTORY: See the Clinical Information for Interpreting Provider; fall,  evaluate fracture.     TECHNIQUE: Axial images of the cervical spine were obtained without  intravenous contrast. Multiplanar reformations were performed.  Radiation dose for this scan was reduced using automated exposure  control, adjustment of the mA and/or kV according to patient size, or  iterative reconstruction technique.     COMPARISON: None.    FINDINGS: Sagittal reconstructions demonstrate minimal retrolisthesis  of C6 on C7 of approximately 2 mm, otherwise normal posterior  alignment. There is no evidence for any acute fracture. Multilevel  degenerative disc and facet disease is present with mild or  mild-to-moderate central canal stenoses at several levels. Paraspinal  soft tissues are unremarkable  except for some vascular calcifications.  There is extensive peripherally calcified pannus formation at the  atlantoaxial joint, but there is no significant stenosis.      Impression    IMPRESSION: Degenerative changes. No evidence for fracture.    TEETEE JOY MD   XR Pelvis w Hip Right 1 View    Narrative    PELVIS WITH RIGHT HIP LATERAL TWO VIEWS 2/4/2019 3:27 PM     HISTORY: Fall, tenderness, evaluate fracture.    COMPARISON: None.      Impression    IMPRESSION: There is medial joint space loss at the right hip with  coxa profunda compatible with degenerative change. There are prominent  trabecular lines along the ilioischial line on the right of doubtful  significance. No acute fracture is demonstrated. No dislocation.    BERONICA BUCHANAN MD   XR Chest 2 Views    Narrative    CHEST TWO VIEWS 2/4/2019 3:27 PM     HISTORY: Fall, evaluate trauma.    COMPARISON: None.    FINDINGS: The heart is enlarged and there is interstitial thickening.  No pneumothorax or pleural effusion. No displaced rib fracture  identified.       Impression    IMPRESSION: Radiographic findings concerning for CHF. Chronic  cardiomegaly and pulmonary fibrosis could have a similar appearance.    MD David PEARCE DO MPH  Formerly [facility] Hospitalist  [address] [facility] [place] [address]  Pager: [phone]  02/05/2019

## 2019-02-05 NOTE — CONSULTS
"[facility]    Cardiology Consultation     Date of Admission:  2/4/2019    Primary Care Physician   [facility]     Consult Date:  02/05/2019      REASON FOR CONSULTATION:  Congestive heart failure.      REFERRING PHYSICIAN:  Jesus Denson MD      I am asked to see it as a pleasant 86-year-old gentleman who has been diagnosed with congestive heart failure on this current hospital admission.  Perhaps biochemically he may be in congestive heart failure with a NT-proBNP of 7306.  His chest x-ray was reported by the radiologist as showing pulmonary edema.  I personally reviewed this film.  It is a suboptimal rotated film and I am not entirely sure that the diagnosis of congestive heart failure is necessarily supported by this chest x-ray.      In terms of symptoms, this gentleman tells me that he has no heart failure symptoms such as shortness of breath, PND, orthopnea or ankle swelling.  By physical examination, he has no evidence of congestive heart failure. His troponin levels are flat without typical rise and fall pattern of myocardial injury.  It is probably likely due to the presence of chronic afib and age.     Overall, this is a gentleman who admitted with frequent falls which is probably not cardiac in origin.  He is elderly, appears frail and is DNR/DNI.  He has no symptoms or signs of CHF and I would not treat him based on test results alone.  I do not think he warrants diuresis at this time and there is nothing \"actionable\" based on his last echocardiogram which showed preserved ejection fraction with hemodynamically insignificant valvular lesions.  He does have chronic atrial fibrillation, and he is rate controlled.  He is also takes warfarin.  With his history of recent recurrent falls, it may be a good idea to stop this medication altogether.  Perhaps he may benefit from outpatient 30 day event monitor to exclude significant bradyarrythmias as a cause for his falls.   "   I will sign off at this time, please call if any questions.      HISTORY OF PRESENT ILLNESS:  A very pleasant 86-year-old gentleman who has had chronic atrial fibrillation for some time.  Echocardiography several months ago showed the above findings.  He is admitted with recurrent falls resulting in vertebral fractures.  He is wearing a brace at this time.  As mentioned above, de  Past Medical History   I have reviewed this patient's medical history and updated it with pertinent information if needed.   History reviewed. No pertinent past medical history.    Past Surgical History   I have reviewed this patient's surgical history and updated it with pertinent information if needed.  Past Surgical History:   Procedure Laterality Date     BACK SURGERY      Laminectomy      HERNIA REPAIR         Prior to Admission Medications   Prior to Admission Medications   Prescriptions Last Dose Informant Patient Reported? Taking?   metoprolol tartrate (LOPRESSOR) 25 MG tablet 2/4/2019 at x1  Yes Yes   Sig: Take 25 mg by mouth 2 times daily   warfarin (COUMADIN) 5 MG tablet 2/3/2019 at Unknown time Son Yes Yes   Sig: Take 5 mg by mouth M,T, W,Th,Sat,Sun   warfarin (COUMADIN) 5 MG tablet Past Week at Unknown time  Yes Yes   Sig: Take 7.5 mg by mouth On Fr      Facility-Administered Medications: None     Allergies   No Known Allergies    Social History   I have reviewed this patient's social history and updated it with pertinent information if needed. Sharath Stevens Annie  reports that he has quit smoking. He quit smokeless tobacco use about 29 years ago.    Family History   I have reviewed this patient's family history and updated it with pertinent information if needed.   No family history on file.    Review of Systems   The 10 point Review of Systems is negative other than noted in the HPI or here.     Physical Exam   Temp: 97.1  F (36.2  C) Temp src: Oral BP: (!) 145/97 Pulse: 124 Heart Rate: 132 Resp: 18 SpO2: 91 % O2  Device: None (Room air) Oxygen Delivery: 1.5 LPM  Vital Signs with Ranges  Temp:  [97.1  F (36.2  C)-98.5  F (36.9  C)] 97.1  F (36.2  C)  Pulse:  [124] 124  Heart Rate:  [110-132] 132  Resp:  [18-20] 18  BP: (115-145)/(74-97) 145/97  SpO2:  [91 %-96 %] 91 %  158 lbs 0 oz    GENERAL:  Pleasant gentleman in no acute distress.  No clubbing.  No peripheral or central cyanosis.   HEENT:  Mucous membranes appear a little pale.  He is frail and tremulous.   NECK:  Examination revealed no raised JVP, no thyromegaly.   CARDIAC EXAMINATION:  Cardiac apex is not palpable. Heart sounds unremarkable.   CHEST:  Symmetrical expansion without use of accessory muscles.  Breath sounds are quiet but no added wheezes or crackles.   ABDOMEN:  Soft and nontender.  No hepatosplenomegaly.  The abdominal aorta is not palpable.   EXTREMITIES:  He has no significant peripheral edema.  Foot pulses are mildly diminished.   CENTRAL NERVOUS SYSTEM:  Grossly intact.   PSYCHIATRIC EXAMINATION:  Appears oriented.      LABORATORY DATA:  EKG shows atrial fibrillation with a controlled ventricular response.  There is a right bundle branch block pattern.  Echocardiography in 11/2018 showed preserved left and right ventricular systolic function.  Pulmonary artery pressures appeared normal.  There is mild tricuspid and mitral regurgitation.      Basic metabolic panel essentially within normal limits.  White cell count 13.2, hemoglobin 11.7, platelet count of 105.        Data   Results for orders placed or performed during the hospital encounter of 02/04/19 (from the past 24 hour(s))   Lactic acid level STAT for sepsis protocol   Result Value Ref Range    Lactate for Sepsis Protocol 1.0 0.7 - 2.0 mmol/L   INR   Result Value Ref Range    INR 3.39 (H) 0.86 - 1.14   Basic metabolic panel   Result Value Ref Range    Sodium 138 133 - 144 mmol/L    Potassium 3.5 3.4 - 5.3 mmol/L    Chloride 107 94 - 109 mmol/L    Carbon Dioxide 24 20 - 32 mmol/L    Anion Gap 7  3 - 14 mmol/L    Glucose 123 (H) 70 - 99 mg/dL    Urea Nitrogen 28 7 - 30 mg/dL    Creatinine 0.79 0.66 - 1.25 mg/dL    GFR Estimate 81 >60 mL/min/[1.73_m2]    GFR Estimate If Black >90 >60 mL/min/[1.73_m2]    Calcium 8.2 (L) 8.5 - 10.1 mg/dL                   RADHA JONES MD, [facility]             D: 2019   T: 2019   MT: LEX      Name:     LETI DAVIS   MRN:      [id number]        Account:       [id number]   :      1932           Consult Date:  2019      Document: [id number]       cc: Jesus Denson MD

## 2019-02-05 NOTE — CONSULTS
CORE Clinic evaluation referral received from Dr Denson.  Patient is not currently established in the CORE Clinic.     Upon chart review and Dr Pascual's consult note, patient is not a candidate for CORE at this time. If anything changes, please met CORE Clinic aware and we would be more happy to assist in a CORE Enrollment appointment.         Rosa Maria Sarah RN  CORE Clinic Care Coordinator  [facility]  [phone]      C.O.R.E. Clinic: Cardiomyopathy, Optimization, Rehabilitation, Education   The C.O.R.E. Clinic is a heart failure specialty clinic within the [facility] where you will work with your cardiologist, nurse practitioners, physician assistants and registered nurses who specialize in heart failure care. They are dedicated to helping patients with heart failure to carefully adjust medications, receive education, and learn who and when to call if symptoms develop. They specialize in helping you better understand your condition, slow the progression of your disease, improve the length and quality of your life, help you detect future heart problems before they become life threatening, and avoid hospitalizations.

## 2019-02-06 ENCOUNTER — APPOINTMENT (OUTPATIENT)
Dept: PHYSICAL THERAPY | Facility: CLINIC | Age: 84
DRG: 552 | End: 2019-02-06
Attending: HOSPITALIST
Payer: MEDICARE

## 2019-02-06 LAB
ANION GAP SERPL CALCULATED.3IONS-SCNC: 7 MMOL/L (ref 3–14)
BUN SERPL-MCNC: 28 MG/DL (ref 7–30)
CALCIUM SERPL-MCNC: 8.2 MG/DL (ref 8.5–10.1)
CHLORIDE SERPL-SCNC: 107 MMOL/L (ref 94–109)
CO2 SERPL-SCNC: 24 MMOL/L (ref 20–32)
CREAT SERPL-MCNC: 0.79 MG/DL (ref 0.66–1.25)
GFR SERPL CREATININE-BSD FRML MDRD: 81 ML/MIN/{1.73_M2}
GLUCOSE SERPL-MCNC: 123 MG/DL (ref 70–99)
INR PPP: 3.39 (ref 0.86–1.14)
LACTATE BLD-SCNC: 1 MMOL/L (ref 0.7–2)
POTASSIUM SERPL-SCNC: 3.5 MMOL/L (ref 3.4–5.3)
SODIUM SERPL-SCNC: 138 MMOL/L (ref 133–144)
TSH SERPL DL<=0.005 MIU/L-ACNC: 1.03 MU/L (ref 0.4–4)

## 2019-02-06 PROCEDURE — 80048 BASIC METABOLIC PNL TOTAL CA: CPT | Performed by: INTERNAL MEDICINE

## 2019-02-06 PROCEDURE — 12000000 ZZH R&B MED SURG/OB

## 2019-02-06 PROCEDURE — 99207 ZZC CDG-MDM COMPONENT: MEETS LOW - DOWN CODED: CPT | Performed by: HOSPITALIST

## 2019-02-06 PROCEDURE — 85610 PROTHROMBIN TIME: CPT | Performed by: INTERNAL MEDICINE

## 2019-02-06 PROCEDURE — 25000132 ZZH RX MED GY IP 250 OP 250 PS 637: Mod: GY | Performed by: INTERNAL MEDICINE

## 2019-02-06 PROCEDURE — 97161 PT EVAL LOW COMPLEX 20 MIN: CPT | Mod: GP

## 2019-02-06 PROCEDURE — 97116 GAIT TRAINING THERAPY: CPT | Mod: GP

## 2019-02-06 PROCEDURE — 99232 SBSQ HOSP IP/OBS MODERATE 35: CPT | Performed by: HOSPITALIST

## 2019-02-06 PROCEDURE — 83605 ASSAY OF LACTIC ACID: CPT | Performed by: INTERNAL MEDICINE

## 2019-02-06 PROCEDURE — 97530 THERAPEUTIC ACTIVITIES: CPT | Mod: GP

## 2019-02-06 PROCEDURE — 40000193 ZZH STATISTIC PT WARD VISIT

## 2019-02-06 PROCEDURE — A9270 NON-COVERED ITEM OR SERVICE: HCPCS | Mod: GY | Performed by: INTERNAL MEDICINE

## 2019-02-06 PROCEDURE — 36415 COLL VENOUS BLD VENIPUNCTURE: CPT | Performed by: INTERNAL MEDICINE

## 2019-02-06 PROCEDURE — 84443 ASSAY THYROID STIM HORMONE: CPT | Performed by: INTERNAL MEDICINE

## 2019-02-06 RX ORDER — METOPROLOL TARTRATE 50 MG
50 TABLET ORAL ONCE
Status: COMPLETED | OUTPATIENT
Start: 2019-02-06 | End: 2019-02-06

## 2019-02-06 RX ORDER — WARFARIN SODIUM 3 MG/1
3 TABLET ORAL
Status: DISCONTINUED | OUTPATIENT
Start: 2019-02-06 | End: 2019-02-06

## 2019-02-06 RX ORDER — METOPROLOL TARTRATE 50 MG
50 TABLET ORAL 2 TIMES DAILY
Status: DISCONTINUED | OUTPATIENT
Start: 2019-02-06 | End: 2019-02-07

## 2019-02-06 RX ADMIN — LISINOPRIL 5 MG: 5 TABLET ORAL at 07:43

## 2019-02-06 RX ADMIN — DOCUSATE SODIUM 100 MG: 100 CAPSULE, LIQUID FILLED ORAL at 21:24

## 2019-02-06 RX ADMIN — ACETAMINOPHEN 650 MG: 325 TABLET, FILM COATED ORAL at 07:44

## 2019-02-06 RX ADMIN — DOCUSATE SODIUM 100 MG: 100 CAPSULE, LIQUID FILLED ORAL at 07:43

## 2019-02-06 RX ADMIN — POTASSIUM CHLORIDE 20 MEQ: 1.5 POWDER, FOR SOLUTION ORAL at 18:29

## 2019-02-06 RX ADMIN — METOPROLOL TARTRATE 25 MG: 25 TABLET ORAL at 07:43

## 2019-02-06 RX ADMIN — METOPROLOL TARTRATE 50 MG: 50 TABLET ORAL at 18:27

## 2019-02-06 ASSESSMENT — ACTIVITIES OF DAILY LIVING (ADL)
ADLS_ACUITY_SCORE: 28
ADLS_ACUITY_SCORE: 38
ADLS_ACUITY_SCORE: 26
ADLS_ACUITY_SCORE: 28
ADLS_ACUITY_SCORE: 38
ADLS_ACUITY_SCORE: 40

## 2019-02-06 NOTE — CONSULTS
NUTRITION EDUCATION      REASON FOR NUTRITION CONSULT:  Provider Order  -  Nutrition Education on a 2 gram sodium diet (CHF order set)    ASSESSMENT:  Defer complete nutrition assessment and instructions - cardiology signed off and no evidence of CHF, not a CORE clinic candidate.    FOLLOW UP:   Please re-consult if nutrition assessment and/or education is warranted, otherwise will follow at length of stay day 7 per protocol.      Antonina Bowman RDN, LD, CNSC  Pager - 3rd floor/ICU: [phone]  Pager - All other floors: [phone]  Pager - Weekend/holiday: [phone]  Office: [phone]

## 2019-02-06 NOTE — PROGRESS NOTES
D:  Per record review, pt's discharge recommendation is TCU.    I:  Sw attempted to see the pt, but he was sound asleep.  Sw spoke with the bedside nurse who said that the pt gets more confused as the day goes on.  Pt currently has a VPM, but they are going to possibly discontinue it and see how the pt does.  Pt will need to be without the VPM for 24 hours prior to discharge to a TCU.  Sw will attempt to see the pt in the morning and reach out to his family as needed.    A/P:  Sw will continue with discharge planning and will be available as needed until discharge.

## 2019-02-06 NOTE — PLAN OF CARE
PT: Patient seen by physical therapy for evaluation and treatment.  Patient with PMH including A-fib now admitted following falls with acute L1 vertebral body fracture and suspected acute congestive heart failure.  Patient unreliable historian, therapist called family for information.  Patient lives in [facility], does not receive any assistance at this time but has option to increase services if needed in the future.  Patient does receive meals in dining room, house keeping and family provides medication set up.  Patient does not use an assistive device, family reports that they have talked with patient about using a walker, but patient has declined.  Family reports a gradual decline in balance and safe mobility over the past year.    Discharge Planner PT   Patient plan for discharge: TCU  Current status: Patient seated in bedside chair upon arrival.   Therapist adjusted TLSO appropriately, education provided regarding spinal precautions, however unknown if patient comprehended education.   Patient attempted sit to stand transfer without walker, with min A from therapist, patient unable to complete.  With walker, patient transferred from chair to standing with min A and direct verbal cueing for technique including hand placement on walker.  Patient amb 5 feet at bedside with 2WW and CGA; patient with slow gait speed, posterior lean (mild), wide base of support.  Patient required max A at LEs for return to supine, patient unable to complete while maintaining spinal precautions secondary to limited ability to follow cueing during transfer.  Patient able to perform bed mobility including scooting with cueing from therapist.  Barriers to return to prior living situation: high fall risk, lives alone, requires assist for limited mobility  Recommendations for discharge:TCU  Rationale for recommendations: Patient presenting below baseline for functional mobility.  Patient currently unable to complete  transfers at bed, chair or toilet without assistance.  Patient would benefit from ongoing physical therapy in order to increase strength, balance and independence with mobility.       Entered by: Caity Schmitz 02/06/2019 9:03 AM

## 2019-02-06 NOTE — PROGRESS NOTES
[facility]    Hospitalist Progress Note  Name: Sharath Valencia    MRN: [id number]  Provider:  David Barcenas DO MPH  Date of Service: 02/06/2019    Summary of Stay: Sharath Valencia is a 86 year old male with PMH including atrial fibrillation on Coumadin who presents with multiple recent falls including 5 days ago, 3 days ago and then last night when he tried to get up and go to the bathroom.  Following his fall 3 days ago he developed lower back pain.  He denies losing consciousness or passing out and these were apparently felt to be mechanical falls.      In the emergency room he was normotensive and afebrile.  He was initially tachycardic to 108 and was noted to be in atrial fibrillation with an incomplete right bundle branch block on EKG.  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  Chest x-ray showed cardiomegaly as well as pulmonary congestion versus fibrosis.  Lab workup was notable for an elevated troponin of 0.103, therapeutic INR of 2.91 and elevated BNP of 7306.  White blood count was also elevated at 13.9.  Urine analysis was notable for glucose urea, microscopic proteinuria and a few bacteria but otherwise negative.  He was given IV Lasix 20 mg and an aspirin and admitted for further management.     Assessment and Plan:   1.  Possible acute congestive heart failure: His last echo from 2017 showed normal EF but is elevated BNP, elevated troponin and chest x-ray concerning for some vascular prominence all are suggestive to congestive heart failure on presentation.  He may have developed a cardiomyopathy related to his Afib and inadequate rate control or alternately given his elevated troponin could have obstructive coronary disease but this seems less likely.  Appears euvolemic today, stopping diuretics.  --serial troponins relatively stable  --unchanged and grossly normal echocardiogram  --stop  diuresis  --intake/output and daily weights  --cardiology consult appreciated     2.   Falls with low back pain found to have an acute L1 fracture: Suspect mechanical based on his description but cannot rule out syncope completely.  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  His pain seems to be actually lower than L1.  Will consult neurosurgery for further evaluation and management.  He is neurologically intact.  Will be fitted for a TLSO brace.  Further recommendations per neurosurgery.  --neurosurgery consult  --PT/OT/SW  --needs TCU     3.  Atrial fibrillation on coumadin: Appears to have been diagnosed in 2017 during an admit for syncope.  Chronically on metoprolol and Coumadin.  INR currently therapeutic at 2.92.  EKG does show A. Fib.  Heart rate seems above goal at this time.  --Stop coumadin due to frequent falling  --Increase home metoprolol to 50 BID due to inadequate rate control     4.  Leukocytosis: Denies recent infectious symptoms.  Most likely stress response.  Trending down.  Undetectable procalcitonin.     5.  Elevated troponin: It sounds as though his exercise capacity has certainly been diminishing and he is had a lot of dyspnea on exertion and sleeping up in a recliner but no jimmie chest pressure or chest pain.  Certainly dyspnea could be his anginal equivalent and will continue to trend troponins and check an echocardiogram.  Will hold off on ischemic evaluation at this time.    6.  Encephalopathy vs chronic cognitive impairment: Appears as though he has waxing waning mental status at baseline consistent with some degree of dementia.  Currently more confused than yesterday.  I don't appreciate a reversible component.  Neurologically fully intact.  Check TSH, otherwise continue to monitor for now.    DVT Prophylaxis:  Warfarin  Code Status: DNR/DNI  Disposition: Expected discharge in 2-3 days to TCU. Goals prior to discharge include cardiac work up and treatment.   Incidental Findings: None.  Family updated today: Will call daughter later in the day.     Interval History   The patient reports doing well. No chest pain or shortness of breath. Reports some confusion and weakness.  No nausea, vomiting, diarrhea, constipation. No fevers. No other specific complaints identified.     -Data reviewed today: I personally reviewed all new labs and imaging results over the last 24 hours.     Physical Exam   Temp: 97.1  F (36.2  C) Temp src: Oral BP: (!) 145/97 Pulse: 124 Heart Rate: 132 Resp: 18 SpO2: 91 % O2 Device: None (Room air)    Vitals:    02/05/19 1032   Weight: 71.7 kg (158 lb)     Vital Signs with Ranges  Temp:  [97.1  F (36.2  C)-98.5  F (36.9  C)] 97.1  F (36.2  C)  Pulse:  [124] 124  Heart Rate:  [110-132] 132  Resp:  [18-20] 18  BP: (115-145)/(74-97) 145/97  SpO2:  [91 %-94 %] 91 %  I/O last 3 completed shifts:  In: 360 [P.O.:360]  Out: 625 [Urine:625]    GENERAL: No apparent distress. Awake, alert, and fully oriented.  HEENT: Normocephalic, atraumatic. Extraocular movements intact.  CARDIOVASCULAR: Tachy and IRR IRR without murmurs or rubs. No S3.  PULMONARY: Clear bilaterally.  GASTROINTESTINAL: Soft, non-tender, non-distended. Bowel sounds normoactive.   EXTREMITIES: No cyanosis or clubbing. No edema.  NEUROLOGICAL: CN 2-12 grossly intact, no focal neurological deficits.  DERMATOLOGICAL: No rash, ulcer, bruising, nor jaundice.     Medications     - MEDICATION INSTRUCTIONS -       Warfarin Therapy Reminder         docusate sodium  100 mg Oral BID     metoprolol tartrate  50 mg Oral BID     Data     Laboratory:  Recent Labs   Lab 02/05/19  0628 02/04/19  1305   WBC 13.2* 13.9*   HGB 11.7* 11.9*   HCT 36.3* 36.4*   MCV 97 96   * 116*     Recent Labs   Lab 02/06/19  0750 02/05/19  0628 02/04/19  1305    142 143    POTASSIUM 3.5 3.7 3.6   CHLORIDE 107 110* 109   CO2 24 24 25   ANIONGAP 7 8 9   * 108* 115*   BUN 28 28 27   CR 0.79 0.80 0.83   GFRESTIMATED 81 80 79   GFRESTBLACK >90 >90 >90   CUCA 8.2* 8.6 8.8     Recent Labs   Lab 02/04/19  1305   NTBNPI 7,306*     Recent Labs   Lab 02/05/19  0243 02/04/19  2230 02/04/19  1850   TROPI 0.096* 0.106* 0.113*     No results for input(s): CULT in the last 168 hours.    Imaging:  No results found for this or any previous visit (from the past 24 hour(s)).      David Barcenas DO MPH  [facility] Hospitalist  [address] [place] [address]  Pager: [phone]  02/06/2019

## 2019-02-06 NOTE — PLAN OF CARE
More alert in the morning with slowly appears more confused as days goes on,this is his norm per his granddaughter who visited. Occ sets off bed alarm. Up with TLSO brace. Pain when sits with relief once applied. Using the ellis steady. Anticipate will need TCU on discharge. HR better, Metoprolol adjusted for better control.

## 2019-02-06 NOTE — PROGRESS NOTES
02/06/19 0848   Quick Adds   Type of Visit Initial PT Evaluation   Living Environment   Lives With alone   Living Arrangements independent living facility   Home Accessibility no concerns   Living Environment Comment Patient lives in [facility] at [facility].  Does not receive any services beyond meals in dining room and house keeping.  Family does medication management   Self-Care   Usual Activity Tolerance moderate   Current Activity Tolerance fair   Regular Exercise No   Equipment Currently Used at Home none   Activity/Exercise/Self-Care Comment Family reports that patient does not use any assistive device, they have tried but patient has declined.   Functional Level Prior   Ambulation 0-->independent   Transferring 0-->independent   Toileting 0-->independent   Bathing 0-->independent   Communication 0-->understands/communicates without difficulty   Swallowing 0-->swallows foods/liquids without difficulty   Fall history within last six months yes   Number of times patient has fallen within last six months 3   Which of the above functional risks had a recent onset or change? ambulation;transferring   Prior Functional Level Comment Family reports that patient has been gradually declining over the past year, with signifcant decrease in mobility over the past couple of months   General Information   Onset of Illness/Injury or Date of Surgery - Date 02/04/19   Referring Physician David Barcenas, DO   Patient/Family Goals Statement discharge to TCU, will then increase services   Pertinent History of Current Problem (include personal factors and/or comorbidities that impact the POC) Patient with PMH including A-fib now admitted following falls with acute L1 vertebral body fracture and suspected acute congestive heart failure.     Cognitive Status Examination   Orientation person   Memory impaired   C[facility] Comment [facility]   Pain Assessment   Patient Currently in Pain Yes, see Vital Sign  "flowsheet  (pain with mobility)   Integumentary/Edema   Integumentary/Edema Comments multiple bruises/scabbing   Posture    Posture Forward head position;Protracted shoulders   Range of Motion (ROM)   ROM Comment WFL   Strength   Strength Comments global strength deficits, requires assistance to perform transfer from chair   Bed Mobility   Bed Mobility Comments min A and verbal cueing, unable to perform with spinal precuations   Transfer Skills   Transfer Comments min A and 2WW   Gait   Gait Comments Amb 5 feet at baseline with 2WW and CGA, with posterior lean, shuffling giat   Balance   Balance Comments Requires CGA with standing activity   General Therapy Interventions   Planned Therapy Interventions balance training;bed mobility training;gait training;strengthening;transfer training;home program guidelines;progressive activity/exercise   Clinical Impression   Criteria for Skilled Therapeutic Intervention yes, treatment indicated   PT Diagnosis decreased independence with mobiliy   Influenced by the following impairments pain   Functional limitations due to impairments transfers, gait   Clinical Presentation Stable/Uncomplicated   Clinical Presentation Rationale moderately complex pmh, stable presentation, limited social support   Clinical Decision Making (Complexity) Low complexity   Therapy Frequency` 5 times/week   Predicted Duration of Therapy Intervention (days/wks) 3 days   Anticipated Equipment Needs at Discharge (walker, if patient does not go to TCU)   Anticipated Discharge Disposition Transitional Care Facility   Risk & Benefits of therapy have been explained Yes   Patient, Family & other staff in agreement with plan of care Yes   [facility] TM \"6 Clicks\"   2016, Trustees of [facility], under license to [employer].  All rights reserved.   6 Clicks Short Forms Basic Mobility Inpatient Short Form   [facility] AM-PAC  \"6 Clicks\" V.2 Basic Mobility Inpatient Short Form   1. " Turning from your back to your side while in a flat bed without using bedrails? 3 - A Little   2. Moving from lying on your back to sitting on the side of a flat bed without using bedrails? 2 - A Lot   3. Moving to and from a bed to a chair (including a wheelchair)? 3 - A Little   4. Standing up from a chair using your arms (e.g., wheelchair, or bedside chair)? 3 - A Little   5. To walk in hospital room? 2 - A Lot   6. Climbing 3-5 steps with a railing? 1 - Total   Basic Mobility Raw Score (Score out of 24.Lower scores equate to lower levels of function) 14   Total Evaluation Time   Total Evaluation Time (Minutes) 5

## 2019-02-06 NOTE — PLAN OF CARE
Orientation: Alert and oriented x4  VSS. 92% on RA.   Tele: Afib CVR. .   LS: Diminished, denies SOB/TOUSSAINT  GI: BS audible/active x4, tolerating PO. Passing gas. No BM this shift. Denies N/V.   : Adequate urine output. Yes, incontinent  Skin: Intact, bruising to buttocks  Activity: 2 assist. Intermittent restlessness through the night.  Pain: 0/10  Plan: Pain control, supportive cares, discharge TBD. Continue with current cares.

## 2019-02-06 NOTE — PLAN OF CARE
Discharge Planner OT   Patient plan for discharge: TCU  Current status: Order received, chart reviewed. Per chart review and discussion with PT, pt will require TCU at discharge as unsafe to return to previously living environment at this time. Pt requiring significant assist for all transfers/mobility as well as ADLS/IADLs as this time. Pt will benefit from skilled OT, however most appropriate to defer to TCU setting to initiate OT services. Will complete current IP OT order with recommendation for OT eval at TCU.   Barriers to return to prior living situation: High fall risk, lives alone, requires assist for limited mobility/ADLs, impaired safety/cognition  Recommendations for discharge: TCU - defer OT eval to TCU  Rationale for recommendations: Pt significantly below baseline level of functioning and will require TCU at discharge - will defer OT eval to TCU.        Entered by: Frances Whelan 02/06/2019 10:47 AM

## 2019-02-07 ENCOUNTER — DOCUMENTATION ONLY (OUTPATIENT)
Dept: OTHER | Facility: CLINIC | Age: 84
End: 2019-02-07

## 2019-02-07 ENCOUNTER — APPOINTMENT (OUTPATIENT)
Dept: PHYSICAL THERAPY | Facility: CLINIC | Age: 84
DRG: 552 | End: 2019-02-07
Payer: MEDICARE

## 2019-02-07 LAB
ANION GAP SERPL CALCULATED.3IONS-SCNC: 7 MMOL/L (ref 3–14)
BUN SERPL-MCNC: 25 MG/DL (ref 7–30)
CALCIUM SERPL-MCNC: 8.1 MG/DL (ref 8.5–10.1)
CHLORIDE SERPL-SCNC: 107 MMOL/L (ref 94–109)
CO2 SERPL-SCNC: 27 MMOL/L (ref 20–32)
CREAT SERPL-MCNC: 0.75 MG/DL (ref 0.66–1.25)
ERYTHROCYTE [DISTWIDTH] IN BLOOD BY AUTOMATED COUNT: 14.3 % (ref 10–15)
GFR SERPL CREATININE-BSD FRML MDRD: 83 ML/MIN/{1.73_M2}
GLUCOSE SERPL-MCNC: 99 MG/DL (ref 70–99)
HCT VFR BLD AUTO: 35.4 % (ref 40–53)
HGB BLD-MCNC: 11.9 G/DL (ref 13.3–17.7)
INR PPP: 2.8 (ref 0.86–1.14)
LACTATE BLD-SCNC: 1.2 MMOL/L (ref 0.7–2)
MCH RBC QN AUTO: 32.5 PG (ref 26.5–33)
MCHC RBC AUTO-ENTMCNC: 33.6 G/DL (ref 31.5–36.5)
MCV RBC AUTO: 97 FL (ref 78–100)
PLATELET # BLD AUTO: 134 10E9/L (ref 150–450)
POTASSIUM SERPL-SCNC: 3.8 MMOL/L (ref 3.4–5.3)
RBC # BLD AUTO: 3.66 10E12/L (ref 4.4–5.9)
SODIUM SERPL-SCNC: 141 MMOL/L (ref 133–144)
WBC # BLD AUTO: 11.1 10E9/L (ref 4–11)

## 2019-02-07 PROCEDURE — 99232 SBSQ HOSP IP/OBS MODERATE 35: CPT | Performed by: INTERNAL MEDICINE

## 2019-02-07 PROCEDURE — A9270 NON-COVERED ITEM OR SERVICE: HCPCS | Mod: GY | Performed by: INTERNAL MEDICINE

## 2019-02-07 PROCEDURE — 97530 THERAPEUTIC ACTIVITIES: CPT | Mod: GP | Performed by: PHYSICAL THERAPIST

## 2019-02-07 PROCEDURE — 12000000 ZZH R&B MED SURG/OB

## 2019-02-07 PROCEDURE — 99207 ZZC CDG-MDM COMPONENT: MEETS LOW - DOWN CODED: CPT | Performed by: INTERNAL MEDICINE

## 2019-02-07 PROCEDURE — 36415 COLL VENOUS BLD VENIPUNCTURE: CPT | Performed by: INTERNAL MEDICINE

## 2019-02-07 PROCEDURE — 25000132 ZZH RX MED GY IP 250 OP 250 PS 637: Mod: GY | Performed by: INTERNAL MEDICINE

## 2019-02-07 PROCEDURE — 80048 BASIC METABOLIC PNL TOTAL CA: CPT | Performed by: INTERNAL MEDICINE

## 2019-02-07 PROCEDURE — G0463 HOSPITAL OUTPT CLINIC VISIT: HCPCS

## 2019-02-07 PROCEDURE — 40000193 ZZH STATISTIC PT WARD VISIT: Performed by: PHYSICAL THERAPIST

## 2019-02-07 PROCEDURE — 83605 ASSAY OF LACTIC ACID: CPT | Performed by: INTERNAL MEDICINE

## 2019-02-07 PROCEDURE — 25000125 ZZHC RX 250: Performed by: HOSPITALIST

## 2019-02-07 PROCEDURE — 25000132 ZZH RX MED GY IP 250 OP 250 PS 637: Mod: GY | Performed by: HOSPITALIST

## 2019-02-07 PROCEDURE — 85610 PROTHROMBIN TIME: CPT | Performed by: INTERNAL MEDICINE

## 2019-02-07 PROCEDURE — 85027 COMPLETE CBC AUTOMATED: CPT | Performed by: INTERNAL MEDICINE

## 2019-02-07 RX ORDER — METOPROLOL TARTRATE 1 MG/ML
5 INJECTION, SOLUTION INTRAVENOUS EVERY 6 HOURS
Status: DISCONTINUED | OUTPATIENT
Start: 2019-02-07 | End: 2019-02-08

## 2019-02-07 RX ADMIN — ACETAMINOPHEN 650 MG: 325 TABLET, FILM COATED ORAL at 03:19

## 2019-02-07 RX ADMIN — METOPROLOL TARTRATE 5 MG: 1 INJECTION, SOLUTION INTRAVENOUS at 21:47

## 2019-02-07 RX ADMIN — METOPROLOL TARTRATE 50 MG: 50 TABLET ORAL at 09:13

## 2019-02-07 RX ADMIN — ACETAMINOPHEN 650 MG: 325 TABLET, FILM COATED ORAL at 17:54

## 2019-02-07 ASSESSMENT — ACTIVITIES OF DAILY LIVING (ADL)
ADLS_ACUITY_SCORE: 24
ADLS_ACUITY_SCORE: 26
ADLS_ACUITY_SCORE: 26
ADLS_ACUITY_SCORE: 24

## 2019-02-07 NOTE — PLAN OF CARE
VSS. Pt restless at times. PRN tylenol given for generalized pain with relief. Tele Afib RVR.of [facility]  since 1620. Will continue to monitor.

## 2019-02-07 NOTE — PLAN OF CARE
VSS. Disoriented to time and situation. VPM off at 1620 for pending discharge to TCU. Denies SOB. Up with ellis steady to chair and bedside commode; TLSO brace on when out of bed. Good appetite. Tele Afib RVR; this PM HR 120s-130; scheduled Metoprolol given early; HR now in the 90s. K 3.5, replaced and rechecked ordered for tomorrow morning. INR 3.39. Cards/PT/SW following. Family at bedside.

## 2019-02-07 NOTE — PLAN OF CARE
Discharge Planner PT   Patient plan for discharge: TCU  Current status: Patient seated in bed following commode transfer with RNs. Pt agreeable to working with therapy. Pt was assisted with TLSO appropriately, poor retention from yesterday re:spinal precautions.  Pt was cued for sit to stand, FWW, min A. Pt was cued for ambulation, several pauses, and difficulty managing walker. Pt declined attempt to ambulate out of room, reporting fatigue and pain. Pt was able to ambulate 10 feet in room. Pt reporting minimal pain increases with ambulation and standing, although again frequent breaks. Pt poor posture, manual cues for posture correction. Pt was cued for return to supine as declined sitting up in bedside chair. Pt able to perform with cues for spinal prec and min/mod A.   Barriers to return to prior living situation: high fall risk, lives alone, requires assist for limited mobility  Recommendations for discharge:TCU  Rationale for recommendations: Patient presenting below baseline for functional mobility.  Patient currently unable to complete transfers at bed, chair or toilet without assistance.  Patient would benefit from ongoing physical therapy in order to increase strength, balance and independence with mobility.       Entered by: Nikia Frazier 02/07/2019 9:55 AM

## 2019-02-07 NOTE — CONSULTS
Care Coordination:  CTS following for elevated BNP. Pt was admitted with falls and found to have an L1 fracture. He has been fitted with a TLSO brace. His BNP on admit was 7306 and he is being followed by cardiology. He is being treated with IV lasix. PT/OT are recommending TCU on discharge. SW is following for discharge plan to TCU. Referrals have been sent. Pt is having intermittent periods of confusion and is not appropriate for CHF teaching at this time. Will send handoff to CTS for Dr Alvarado on discharge regarding hospital plan and recommendations. He follows with Dr Arredondo at [facility]. No follow up will be scheduled as plan is for TCU on discharge.    Merly Silver RN CTS

## 2019-02-07 NOTE — PROGRESS NOTES
[facility] Nurse Inpatient Adult Pressure Injury (PI) Wound Assessment     Assessment of PI(s) on pt's:   Buttocks    Data:   Patient History:      per MD note(s):  86 year old male with PMH including atrial fibrillation on Coumadin who presents with multiple recent falls.  He fell 5 days ago and then again 3 days ago at his independent living facility.  He denies losing consciousness or passing out and these were apparently felt to be mechanical falls.  Following his fall 3 days ago he developed right-sided low back pain.  He was seen by his son last night but then this morning was found down on the ground naked with urine soaked underwear.  He describes trying to get up to go to the bathroom last night, losing his balance and falling.   Mattress:  Standard , Atmos Air mattress  Current pressure relieving devices:  Pillows    Moisture Management:  Incontinence Protocol and Diaper    Catheter secured? Not applicable    Current Diet / Nutrition:       Orders Placed This Encounter        2 Gram Sodium Diet No Caffeine for 24 hours (once tests completed, may have caffeine)        Darnell Risk Assessment  Sensory Perception: 3-->slightly limited    Moisture: 3-->occasionally moist   Activity: 3-->walks occasionally     Mobility: 3-->slightly limited   Nutrition: 3-->adequate   Darnell Score: 17    Labs:   Recent Labs   Lab Test 02/07/19  0641  10/31/17  2040   ALBUMIN  --   --  3.8   HGB 11.9*   < > 15.1   INR 2.80*   < >  --    WBC 11.1*   < > 7.7    < > = values in this interval not displayed.                                                                                                                          Pressure Injury Assessment  (location #1):   Right Upper Posterior Buttock    Wound History:   See above, history of multiple falls PTA    Specific Dimensions (length x width x depth, in cm) :   Large intact deep blue/purple bruise, soft skin, no induration, no complaint of pain    Multiple  bruises and dry intact thin red scabs to bilat knees and LE.     Blanchable dry skin to heels    Elbows and arms: multiple bruises from blood draws and falls, soft and asymtomatic    Periwound Skin: intact,      Color: normal and consistent with surrounding tissue    Temperature  normal     Drainage:  dry         Odor: none    Pain:  absent , no cpmplaint         Intervention:     Patient's chart evaluated.      Darnell Interventions:  Current Darnell Interventions and Care Plan reviewed and updated, appropriate at this time.    Wound was assessed.    Wound Care: was done: Visual inspection,    Orders  Written    Supplies  gathered and placed at the bedside    Discussed plan of care with Patient, Family and Nurse           Assessment:     Pressure Injury not found; right upper buttocks is clearly a bruised area and does not appear consistent with Deep Tissue Pressure Injury      Status: wound  initial assessment, Asymptomatic    Dry small intact scabs are stable         Plan:     Nursing to notify the Provider(s) and re-consult the [facility] Nurse if wound(s) deteriorate(s)or if the wound care plan needs reevaluation.    Plan for Skin Care: BID/ PRN    1. Incontinence Protocol using Elpidio, wipes, Critic aid paste    2. Apply Sween on dry heel, elbows, scabs to BLE    WO Nurse will return: consult completed

## 2019-02-07 NOTE — PLAN OF CARE
Pt confused to situation, up w/2 and pivot, BM today, afib RVR, pt coughing a lot with thin oral intake -  md ordered SLP eval, off VPM at 0420, awaiting placement after 24 hours, will occ set bed alarm off, will continue POC.

## 2019-02-07 NOTE — PROGRESS NOTES
Radu is aware of consult.  Sw attempted to see the pt, but he was sound asleep.  Sw attempted to see the pt a second time and he told sw that it wasn't a good time to talk.    D:  Per record review, pt's discharge recommendation is TCU.    I:  Radu spoke with the pt's dtr Dianne [phone] who said that the pt would like to go to [facility] since he already lives at [facility].  Dinane said that she spoke with the pt regarding placement and he is okay with [facility] and would like a private room.  They are aware of the additional costs for a private room.    Radu sent the referral to [facility].    A/P:  Sw will attempt to see the pt tomorrow to complete the consult/assessment.  Sw will continue with discharge planning and will be available as needed until discharge.

## 2019-02-08 ENCOUNTER — APPOINTMENT (OUTPATIENT)
Dept: SPEECH THERAPY | Facility: CLINIC | Age: 84
DRG: 552 | End: 2019-02-08
Attending: INTERNAL MEDICINE
Payer: MEDICARE

## 2019-02-08 LAB
ANION GAP SERPL CALCULATED.3IONS-SCNC: 4 MMOL/L (ref 3–14)
BUN SERPL-MCNC: 21 MG/DL (ref 7–30)
CALCIUM SERPL-MCNC: 7.9 MG/DL (ref 8.5–10.1)
CHLORIDE SERPL-SCNC: 107 MMOL/L (ref 94–109)
CO2 SERPL-SCNC: 29 MMOL/L (ref 20–32)
CREAT SERPL-MCNC: 0.8 MG/DL (ref 0.66–1.25)
ERYTHROCYTE [DISTWIDTH] IN BLOOD BY AUTOMATED COUNT: 14.3 % (ref 10–15)
GFR SERPL CREATININE-BSD FRML MDRD: 81 ML/MIN/{1.73_M2}
GLUCOSE SERPL-MCNC: 86 MG/DL (ref 70–99)
HCT VFR BLD AUTO: 37.1 % (ref 40–53)
HGB BLD-MCNC: 11.8 G/DL (ref 13.3–17.7)
INR PPP: 2.15 (ref 0.86–1.14)
MCH RBC QN AUTO: 31.1 PG (ref 26.5–33)
MCHC RBC AUTO-ENTMCNC: 31.8 G/DL (ref 31.5–36.5)
MCV RBC AUTO: 98 FL (ref 78–100)
PLATELET # BLD AUTO: 136 10E9/L (ref 150–450)
POTASSIUM SERPL-SCNC: 4 MMOL/L (ref 3.4–5.3)
RBC # BLD AUTO: 3.8 10E12/L (ref 4.4–5.9)
SODIUM SERPL-SCNC: 140 MMOL/L (ref 133–144)
WBC # BLD AUTO: 9.6 10E9/L (ref 4–11)

## 2019-02-08 PROCEDURE — 92526 ORAL FUNCTION THERAPY: CPT | Mod: GN | Performed by: SPEECH-LANGUAGE PATHOLOGIST

## 2019-02-08 PROCEDURE — 25000125 ZZHC RX 250: Performed by: HOSPITALIST

## 2019-02-08 PROCEDURE — 40000225 ZZH STATISTIC SLP WARD VISIT: Performed by: SPEECH-LANGUAGE PATHOLOGIST

## 2019-02-08 PROCEDURE — 80048 BASIC METABOLIC PNL TOTAL CA: CPT | Performed by: INTERNAL MEDICINE

## 2019-02-08 PROCEDURE — 85027 COMPLETE CBC AUTOMATED: CPT | Performed by: INTERNAL MEDICINE

## 2019-02-08 PROCEDURE — 36415 COLL VENOUS BLD VENIPUNCTURE: CPT | Performed by: INTERNAL MEDICINE

## 2019-02-08 PROCEDURE — 12000000 ZZH R&B MED SURG/OB

## 2019-02-08 PROCEDURE — A9270 NON-COVERED ITEM OR SERVICE: HCPCS | Mod: GY | Performed by: INTERNAL MEDICINE

## 2019-02-08 PROCEDURE — 85610 PROTHROMBIN TIME: CPT | Performed by: INTERNAL MEDICINE

## 2019-02-08 PROCEDURE — 25000132 ZZH RX MED GY IP 250 OP 250 PS 637: Mod: GY | Performed by: INTERNAL MEDICINE

## 2019-02-08 PROCEDURE — 92610 EVALUATE SWALLOWING FUNCTION: CPT | Mod: GN | Performed by: SPEECH-LANGUAGE PATHOLOGIST

## 2019-02-08 PROCEDURE — 99232 SBSQ HOSP IP/OBS MODERATE 35: CPT | Performed by: INTERNAL MEDICINE

## 2019-02-08 RX ORDER — METOPROLOL TARTRATE 25 MG/1
25 TABLET, FILM COATED ORAL 2 TIMES DAILY
Status: DISCONTINUED | OUTPATIENT
Start: 2019-02-08 | End: 2019-02-09 | Stop reason: HOSPADM

## 2019-02-08 RX ORDER — NYSTATIN 100000/ML
500000 SUSPENSION, ORAL (FINAL DOSE FORM) ORAL 4 TIMES DAILY
Status: DISCONTINUED | OUTPATIENT
Start: 2019-02-08 | End: 2019-02-09 | Stop reason: HOSPADM

## 2019-02-08 RX ADMIN — METOPROLOL TARTRATE 5 MG: 1 INJECTION, SOLUTION INTRAVENOUS at 09:01

## 2019-02-08 RX ADMIN — NYSTATIN 500000 UNITS: 500000 SUSPENSION ORAL at 21:19

## 2019-02-08 RX ADMIN — ACETAMINOPHEN 650 MG: 325 TABLET, FILM COATED ORAL at 08:59

## 2019-02-08 RX ADMIN — NYSTATIN 500000 UNITS: 500000 SUSPENSION ORAL at 14:10

## 2019-02-08 RX ADMIN — METOPROLOL TARTRATE 25 MG: 25 TABLET ORAL at 21:19

## 2019-02-08 RX ADMIN — NYSTATIN 500000 UNITS: 500000 SUSPENSION ORAL at 17:45

## 2019-02-08 RX ADMIN — METOPROLOL TARTRATE 5 MG: 1 INJECTION, SOLUTION INTRAVENOUS at 03:50

## 2019-02-08 ASSESSMENT — ACTIVITIES OF DAILY LIVING (ADL)
ADLS_ACUITY_SCORE: 24

## 2019-02-08 ASSESSMENT — MIFFLIN-ST. JEOR: SCORE: 1305.87

## 2019-02-08 NOTE — PLAN OF CARE
Heart Failure Care Pathway  GOALS TO BE MET BEFORE DISCHARGE:    1. Decrease congestion and/or edema with diuretic therapy to achieve near      optimal volume status.            Dyspnea improved:  Yes            Edema improved:     Yes        Net I/O and Weights since admission:          01/09 2300 - 02/08 2259  In: 1200 [P.O.:1200]  Out: 1950 [Urine:1950]  Net: -750            Vitals:    02/05/19 1032 02/08/19 0634   Weight: 71.7 kg (158 lb) 65.1 kg (143 lb 9.6 oz)       2.  O2 sats > 92% on RA or at prior home O2 therapy level.          Current oxygenation status:       SpO2: 97 %         O2 Device: None (Room air),            Able to wean O2 this shift to keep sats > 92%:  Yes       Does patient use Home O2? No    3.  Tolerates ambulation and mobility near baseline: No, please explain: lumbar FX         How many times did the patient ambulate with nursing staff this shift o Up in chair x 2 with ellis steady.     Please review the Heart Failure Care Pathway for additional HF goal outcomes.    Tolerated this am applesauce without signs of aspiration. Coughed after small sip of H2O. NPO until ST eval. Tolerated DD2 diet with honey thick liquids. Up in chair with ellis steady. Tele A-fib with HR 70-95. Coccyx bruise. Tongue has thick coated brown residue. MD notified. Nystantin started. Crackles in bilat bases. Up in chair with back brace x 2 with ellis steady.    Emi Byod RN  2/8/2019

## 2019-02-08 NOTE — PROGRESS NOTES
[facility]  Hospitalist Progress Note  Name: Sharath Valencia    MRN: [id number]  YOB: 1932    Age: 86 year old  Date of admission: 2/4/2019  Primary care provider: [facility]      Reason for Stay (Diagnosis): Frequent falls/possible CHF exacerbation         Assessment and Plan:      Summary of Stay: Sharath Valencia is a 86 year old male with PMH including atrial fibrillation on Coumadin who presents with multiple recent falls including 5 days ago, 3 days ago and then last night when he tried to get up and go to the bathroom.  Following his fall 3 days ago he developed lower back pain.  He denies losing consciousness or passing out and these were apparently felt to be mechanical falls.      In the emergency room he was normotensive and afebrile.  He was initially tachycardic to 108 and was noted to be in atrial fibrillation with an incomplete right bundle branch block on EKG.  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 Chest x-ray showed cardiomegaly as well as pulmonary congestion versus fibrosis.  Lab workup was notable for an elevated troponin of 0.103, therapeutic INR of 2.91 and elevated BNP of 7306.  White blood count was also elevated at 13.9.  Urine analysis was notable for glucose urea, microscopic proteinuria and a few bacteria but otherwise negative.  He was given IV Lasix 20 mg and an aspirin and admitted for further management.     Assessment and Plan:   1.  Possible acute congestive heart failure: His last echo from 2017 showed normal EF but is elevated BNP, elevated troponin and chest x-ray concerning for some vascular prominence all are suggestive to congestive heart failure on presentation.  He may have developed a cardiomyopathy related to his Afib and inadequate rate control or alternately given his elevated troponin could have obstructive  coronary disease but this seems less likely.  Appears euvolemic so diuretics were discontinued on 2/6/19.  --serial troponins relatively stable  --unchanged and grossly normal echocardiogram  --intake/output and daily weights  --cardiology consult appreciated     2.   Falls with low back pain found to have an acute L1 fracture: Suspect mechanical based on his description but cannot rule out syncope completely. 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  His pain seems to be actually lower than L1.  Will consult neurosurgery for further evaluation and management.  He is neurologically intact.    --neurosurgery consult this admission appreciated. Should wear TLSO brace at all times when out of bed. Follow with neurosurgery clinic in 6 weeks.  --PT/OT/SW  --needs TCU      3.  Atrial fibrillation on coumadin: Appears to have been diagnosed in 2017 during an admit for syncope.  Chronically on metoprolol and Coumadin.  INR currently therapeutic at 2.92.  EKG does show A. Fib.  Heart rate seems above goal at this time.  --Stopping Coumadin due to frequent falling  --continue metoprolol to 50 BID due to inadequate rate control     4.  Leukocytosis: Denies recent infectious symptoms.  Most likely stress response.  Trending down.  Undetectable procalcitonin.     5.  Elevated troponin: It sounds as though his exercise capacity has certainly been diminishing and he is had a lot of dyspnea on exertion and sleeping up in a recliner but no jimmie chest pressure or chest pain. Will hold off on ischemic evaluation at this time.     6.  Encephalopathy vs chronic cognitive impairment: Appears as though he has waxing waning mental status at baseline consistent with some degree of dementia.  Still moderately confused at time but otherwise pleasant. Neurologically  "fully intact.  TSH was normal.      DVT Prophylaxis: Warfarin  Code Status: DNR / DNI  Discharge Dispo: TCU at discharge  Estimated Disch Date / # of Days until Disch: Suspect 2 days once TCU bed is available.  Time spent: Greater than 35 minutes      Interval History (Subjective):      Denies any dyspnea at rest.  No chest pain.  Low back pain otherwise controlled.         Physical Exam:      Vital signs:  Temp: 98.1  F (36.7  C) Temp src: Oral BP: 131/84   Heart Rate: 112 Resp: 18 SpO2: 97 % O2 Device: None (Room air) Oxygen Delivery: 1.5 LPM Height: 172.7 cm (5' 8\") Weight: 71.7 kg (158 lb)  Estimated body mass index is 24.02 kg/m  as calculated from the following:    Height as of this encounter: 1.727 m (5' 8\").    Weight as of this encounter: 71.7 kg (158 lb).    I/O last 3 completed shifts:  In: 480 [P.O.:480]  Out: 350 [Urine:350]  Vitals:    02/05/19 1032   Weight: 71.7 kg (158 lb)       Constitutional: Awake, alert, cooperative, no apparent distress   Respiratory: Nl work of breathing.  Diminished breath sounds at the bases but no wheezing   Cardiovascular: Regular rate and rhythm, normal S1 and S2, and no murmur noted   Abdomen: Normal bowel sounds, soft, non-distended, non-tender   Skin: No rashes, no cyanosis, dry to touch   Neuro: CN 2-12 intact, no localizing weakness   Extremities: No edema, normal range of motion   HEENT Normocephalic, atraumatic, normal nasal turbinates; oropharynx clear   Neck Supple; nl inspection; trachea midline; no thryomegaly   Psychiatric: A+O x3. Normal affect          Medications:      All current medications were reviewed with changes reflected in problem list.         Data:      All new lab and imaging data was reviewed.   Labs:  Recent Labs   Lab 02/07/19 0641 02/05/19 0628 02/04/19  1305   WBC 11.1* 13.2* 13.9*   HGB 11.9* 11.7* 11.9*   HCT 35.4* 36.3* 36.4*   MCV 97 97 96   * 105* 116*     Recent Labs   Lab 02/07/19  0641 02/06/19  0750 02/05/19  0628 " 02/04/19  1850    138 142  --    POTASSIUM 3.8 3.5 3.7  --    CHLORIDE 107 107 110*  --    CO2 27 24 24  --    ANIONGAP 7 7 8  --    GLC 99 123* 108*  --    BUN 25 28 28  --    CR 0.75 0.79 0.80  --    GFRESTIMATED 83 81 80  --    GFRESTBLACK >90 >90 >90  --    CUCA 8.1* 8.2* 8.6  --    MAG  --   --   --  2.3      Imaging:   No results found for this or any previous visit (from the past 24 hour(s)).    Joe Ramirez [phone]

## 2019-02-08 NOTE — PROGRESS NOTES
CLINICAL SWALLOW EVALUATION     02/08/19 1200   General Information   Onset Date 02/04/19   Start of Care Date 02/08/19   Referring Physician Dr. JOSHI Her   Patient Profile Review/OT: Additional Occupational Profile Info See Profile for full history and prior level of function   Patient/Family Goals Statement Patient would like some lunch.    Swallowing Evaluation Bedside swallow evaluation   Behaviorial Observations Alert   Mode of current nutrition Oral diet   Type of oral diet Regular;Thin liquid   Respiratory Status Room air   Comments Patient admitted with CHF, frequent falls and lumbar fracture.  Patient's PMH is significant for atrial fibrillation, dementia.  Patient and daughter in law report he has had difficulty swallowing pills and tough solids since childhood.  Patient's nursing team reports he is coughing with liquids.    Clinical Swallow Evaluation   Oral Musculature generally intact   Structural Abnormalities none present   Dentition upper dentures  (Partial upper, uses adhesive. )   Mucosal Quality good   Mandibular Strength and Mobility intact   Oral Labial Strength and Mobility WFL   Lingual Strength and Mobility WFL   Velar Elevation intact   Buccal Strength and Mobility intact   Laryngeal Function Cough;Throat clear;Swallow;Voicing initiated;Dry swallow palpated   Oral Musculature Comments Yellow-brown thick coating on tongue. Weak voicing and cough.    Additional Documentation Yes   Additional evaluation(s) completed today Recommended   Rationale for completing additional evaluation Consider return for OP video swallow study with esophagram before return to thin liquids.   Clinical Swallow Eval: Thin Liquid Texture Trial   Mode of Presentation, Thin Liquids spoon;fed by clinician   Volume of Liquid or Food Presented 2 ice chips, 1 sip water   Oral Phase of Swallow Poor AP movement;Premature pharyngeal entry   Pharyngeal Phase of Swallow impaired;coughing/choking  (Delayed, weak cough)   Diagnostic  Statement Suspect aspiration   Clinical Swallow Eval: Nectar Thick Liquid Texture Trial   Mode of Presentation, Nectar cup;spoon;fed by clinician;self-fed   Volume of Nectar Presented 2 oz   Oral Phase, Nectar Premature pharyngeal entry;Poor AP movement   Pharyngeal Phase, Nectar impaired;coughing/choking   Successful Strategies Trialed During Procedure, Nectar chin tuck   Diagnostic Statement Suspect aspiration by cup, ok by spoon with chin tuck use.    Clinical Swallow Eval: Honey Thick Liquid Texture Trial   Mode of Presentation, Honey spoon;self-fed   Volume of Honey Presented 3 oz   Oral Phase, Honey Premature pharyngeal entry;Poor AP movement   Pharyngeal Phase, Honey intact   Successful Strategies Trialed During Procedure, Honey chin tuck   Diagnostic Statement Delayed swallow response   Clinical Swallow Eval: Puree Solid Texture Trial   Mode of Presentation, Puree spoon;self-fed   Volume of Puree Presented 3 oz   Oral Phase, Puree WFL   Pharyngeal Phase, Puree intact   Diagnostic Statement No overt Sx of aspiration   Clinical Swallow Eval: Solid Food Texture Trial   Mode of Presentation, Solid self-fed   Volume of Solid Food Presented 1 cracker   Oral Phase, Solid Residue in oral cavity;Poor AP movement   Pharyngeal Phase, Solid intact   Diagnostic Statement Oral residue, slow mastication.  No overt Sx of aspiration.    Swallow Eval: Clinical Impressions   Skilled Criteria for Therapy Intervention Skilled criteria met.  Treatment indicated.   Functional Assessment Scale (FAS) 3   Dysphagia Outcome Severity Scale (IOANA) Level 3 - IOANA   Treatment Diagnosis Moderate oropharyngeal dysphagia   Diet texture recommendations Dysphagia diet level 2;Honey thick liquids   Recommended Feeding/Eating Techniques small sips/bites;tuck chin during every swallow   Demonstrates Need for Referral to Another Service dietitian;social work   Therapy Frequency daily   Predicted Duration of Therapy Intervention (days/wks) 1 week    Anticipated Discharge Disposition inpatient rehabilitation facility   Risks and Benefits of Treatment have been explained. Yes   Patient, family and/or staff in agreement with Plan of Care Yes   Clinical Impression Comments Patient presents with lifelong dysphagia to solids per family and patient, now exhibiting coughing with liquids.  Patient reports he crushes and chews his pills at home.  He exhibited overt Sx of aspiration with thin liquids and nectar by cup.  Patient exhibits weak voicing and weak cough, throat clear.  Recommend consideration for ENT consult as OP to assess dysphonia and dysphagia. Recommend dysphagia diet level 2 with honey thick liquids while alert, up to chair as able, chin tuck with liquids, and double swallow with solids (likely requiring close supervision).  Recommend crushed medications with puree per patient's baseline.  Paged MD re: possible thrush (thick coating on tongue).   Total Evaluation Time   Total Evaluation Time (Minutes) 45

## 2019-02-08 NOTE — PLAN OF CARE
Patient resting comfortably overnight. Vital signs stable. Alert to self only. Occasional grimacing with movements, but appears comfortable otherwise. Up with ellis steady and TLSO brace. Skin intact, with scattered bruising. Plan to discharge today to TCU. SW following for placement. Tele is A. Fib RVR/CVR. Continue current POC.

## 2019-02-08 NOTE — PLAN OF CARE
Discharge Planner SLP   Patient plan for discharge: TCU  Current status: Clinical swallow evaluation completed.  Patient presents with lifelong dysphagia to solids per family and patient, now exhibiting coughing with liquids.  Patient reports he crushes and chews his pills at home.  He exhibited overt Sx of aspiration with thin liquids and nectar by cup.  Patient exhibits weak voicing and weak cough, throat clear.  Recommend consideration for ENT consult as OP to assess dysphonia and dysphagia. Recommend dysphagia diet level 2 with honey thick liquids while alert, up to chair as able, chin tuck with liquids, and double swallow with solids (likely requiring close supervision).  Recommend crushed medications with puree per patient's baseline.  Paged MD re: possible thrush (thick coating on tongue).  Barriers to return to prior living situation: Weakness, confusion.   Recommendations for discharge: TCU  Rationale for recommendations: Recommend SLP follow up at discharge to ensure safe intake with diet modifications, use of safe swallowing strategies and pharyngeal exercises.        Entered by: Sally Samson 02/08/2019 12:46 PM

## 2019-02-08 NOTE — PROVIDER NOTIFICATION
8:51 PM  Paged Admitting MD  Patient aspirating on water. SLP unable to see patient today. Can you please change PO metoprolol dose to IV. Currently SERVANDO. Emmanuel RVR in the 100's. Thank you.     8:55 PM  MD changed metoprolol to IV.

## 2019-02-08 NOTE — PROGRESS NOTES
D:  Per record review, pt's discharge recommendation is TCU.  Pt was accepted at [facility] [place].    I:  Radu spoke with the pt's dtr Dianne [phone] to update her on the discharge plans to [facility] [place] tomorrow.  Dianne confirmed that the pt will need [facility] wheelchair transport.  She is aware and acknowledges the costs for transport.  Radu reminded Dianne that due to the pt wanting a private room, they will need a check for a $400 deposit.  Radu called [facility] and set-up wheelchair transport for tomorrow at 1430.  Radu called Dianne and informed her that the transport is arranged for 1430 tomorrow.    A/P:  Sw will continue to be available as needed until discharge.

## 2019-02-08 NOTE — PROGRESS NOTES
[facility]  Hospitalist Progress Note  Arturo Sanchez MD, MD 02/08/2019  (Text Page)  Reason for Stay (Diagnosis): Falls         Assessment and Plan:      Summary of Stay: I have utilize my colleagues prior progress notes for other details as I assume care last February 8, 2019.  Summary of Stay: Sharath Valencia is a 86 year old male with PMH including atrial fibrillation on Coumadin who presents with multiple recent falls including 5 days ago, 3 days ago and then last night when he tried to get up and go to the bathroom.  Following his fall 3 days prior to hospitalization  he developed lower back pain.  He denies losing consciousness or passing out and these were apparently felt to be mechanical falls.      In the emergency room he was normotensive and afebrile.  He was initially tachycardic to 108 and was noted to be in atrial fibrillation with an incomplete right bundle branch block on EKG.  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 Chest x-ray showed cardiomegaly as well as pulmonary congestion versus fibrosis.  Lab workup was notable for an elevated troponin of 0.103, therapeutic INR of 2.91 and elevated BNP of 7306.  White blood count was also elevated at 13.9.  Urine analysis was notable for glucose urea, microscopic proteinuria and a few bacteria but otherwise negative.  He was given IV Lasix 20 mg and an aspirin and admitted for further management.     Assessment and Plan:   1.  Possible acute congestive heart failure: His last echo from 2017 showed normal EF but is elevated BNP, elevated troponin and chest x-ray concerning for some vascular prominence all are suggestive to congestive heart failure on presentation.  He may have developed a cardiomyopathy related to his Afib and inadequate rate control or alternately given his elevated troponin could have obstructive coronary disease but this seems less  likely.  Appears euvolemic so diuretics were discontinued on 2/6/19.  --serial troponins relatively stable  --unchanged and grossly normal echocardiogram  --intake/output and daily weights  --cardiology consult appreciated and earlier signed off     2.   Falls with low back pain found to have an acute L1 fracture: Suspect mechanical based on his description but cannot rule out syncope completely. 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  His pain seems to be actually lower than L1.  Will consult neurosurgery for further evaluation and management.  He is neurologically intact.    --neurosurgery consult this admission appreciated. Should wear TLSO brace at all times when out of bed. Follow with neurosurgery clinic in 6 weeks.  --PT/OT/SW  --needs TCU      3.  Atrial fibrillation on coumadin: Appears to have been diagnosed in 2017 during an admit for syncope.  Chronically on metoprolol and Coumadin.    EKG does show A. Fib.  Heart rate seems above goal at this time.  --Decisions made earlier to stop Coumadin due to frequent falling  --continue metoprolol to 50 BID due to inadequate rate control     4.  Leukocytosis: Denies recent infectious symptoms.  Most likely stress response.  Trending down.  Undetectable procalcitonin.     5.  Elevated troponin: It sounds as though his exercise capacity has certainly been diminishing and he is had a lot of dyspnea on exertion and sleeping up in a recliner but no jimmie chest pressure or chest pain. Will hold off on ischemic evaluation at this time.     6.  Encephalopathy vs chronic cognitive impairment: Appears as though he has waxing waning mental status at baseline consistent with some degree of dementia.  Still moderately confused at time but otherwise pleasant. Neurologically fully intact.  TSH was  "normal.     7.  Reportedly with dysphagia and aspiration-like symptoms-we will await further SLP input for further optimization regarding his diet     DVT Prophylaxis: Warfarin  Code Status: DNR / DNI  Discharge Dispo: TCU at discharge  Estimated Disch Date / # of Days until Disch:  Anticipating discharge in the next 24-36 hours.          Interval History (Subjective):      I have assume hospitalist service care today.  Seen and examined.  Chart reviewed.  Case discussed with nursing service.  I found her sitting on the hospital chair and listening to music comfortably.  He denies any ongoing complaints except for being hungry.  No reported significant events overnight but unfortunately had some aspiration-like symptoms while drinking water.  He denies any ongoing shortness of breath, coughing spells, abdominal pain.  Remain afebrile.                  Physical Exam:      Last Vital Signs:  /88 (BP Location: Left arm)   Pulse 90   Temp 97.6  F (36.4  C) (Oral)   Resp 16   Ht 1.727 m (5' 8\")   Wt 65.1 kg (143 lb 9.6 oz)   SpO2 94%   BMI 21.83 kg/m      I/O last 3 completed shifts:  In: 240 [P.O.:240]  Out: 175 [Urine:175]  Wt Readings from Last 1 Encounters:   02/08/19 65.1 kg (143 lb 9.6 oz)       Constitutional: Awake, alert, cooperative, no apparent distress   Respiratory: Clear to auscultation bilaterally, no crackles or wheezing   Cardiovascular: Regular rate and irregularly irregular rhythm, normal S1 and S2   Abdomen: Normal bowel sounds, soft, non-distended, non-tender   Skin: No rashes, no cyanosis, dry to touch   Neuro: Alert and oriented x3, no weakness, spontaneous and coherent speech   Extremities: No edema, normal range of motion   Other(s): Euthymic mood, not agitated    Back brace seen and in place   All other systems: Negative          Medications:      All current medications were reviewed with changes reflected in problem list.         Data:      All new lab and imaging data was " reviewed.   Labs:  No results for input(s): CULT in the last 168 hours.  Recent Labs   Lab 02/08/19  0615 02/07/19  0641 02/05/19  0628   WBC 9.6 11.1* 13.2*   HGB 11.8* 11.9* 11.7*   HCT 37.1* 35.4* 36.3*   MCV 98 97 97   * 134* 105*     Recent Labs   Lab 02/08/19  0615 02/07/19  0641 02/06/19  0750  02/04/19  1850    141 138   < >  --    POTASSIUM 4.0 3.8 3.5   < >  --    CHLORIDE 107 107 107   < >  --    CO2 29 27 24   < >  --    ANIONGAP 4 7 7   < >  --    GLC 86 99 123*   < >  --    BUN 21 25 28   < >  --    CR 0.80 0.75 0.79   < >  --    GFRESTIMATED 81 83 81   < >  --    GFRESTBLACK >90 >90 >90   < >  --    CUCA 7.9* 8.1* 8.2*   < >  --    MAG  --   --   --   --  2.3    < > = values in this interval not displayed.     Recent Labs   Lab 02/08/19  0615 02/07/19  0641 02/06/19  0750 02/05/19  0628 02/04/19  1305   GLC 86 99 123* 108* 115*     Recent Labs   Lab 02/08/19  0615 02/07/19  0641 02/06/19  0750   INR 2.15* 2.80* 3.39*     No results for input(s): LIPASE in the last 168 hours.  Recent Labs   Lab 02/05/19  0243 02/04/19  2230 02/04/19  1850   TROPI 0.096* 0.106* 0.113*     Recent Labs   Lab 02/04/19  1408   COLOR Yellow   APPEARANCE Clear   URINEGLC >499*   URINEBILI Negative   URINEKETONE 5*   SG 1.024   UBLD Negative   URINEPH 5.0   PROTEIN 100*   NITRITE Negative   LEUKEST Negative   RBCU 6*   WBCU 1      Imaging:   No results found for this or any previous visit (from the past 48 hour(s)).

## 2019-02-09 ENCOUNTER — APPOINTMENT (OUTPATIENT)
Dept: SPEECH THERAPY | Facility: CLINIC | Age: 84
DRG: 552 | End: 2019-02-09
Payer: MEDICARE

## 2019-02-09 VITALS
WEIGHT: 144.2 LBS | DIASTOLIC BLOOD PRESSURE: 55 MMHG | HEART RATE: 124 BPM | HEIGHT: 68 IN | OXYGEN SATURATION: 96 % | BODY MASS INDEX: 21.86 KG/M2 | SYSTOLIC BLOOD PRESSURE: 106 MMHG | RESPIRATION RATE: 18 BRPM | TEMPERATURE: 98.2 F

## 2019-02-09 LAB — INR PPP: 1.72 (ref 0.86–1.14)

## 2019-02-09 PROCEDURE — A9270 NON-COVERED ITEM OR SERVICE: HCPCS | Mod: GY | Performed by: INTERNAL MEDICINE

## 2019-02-09 PROCEDURE — 25000132 ZZH RX MED GY IP 250 OP 250 PS 637: Mod: GY | Performed by: INTERNAL MEDICINE

## 2019-02-09 PROCEDURE — 85610 PROTHROMBIN TIME: CPT | Performed by: INTERNAL MEDICINE

## 2019-02-09 PROCEDURE — 36415 COLL VENOUS BLD VENIPUNCTURE: CPT | Performed by: INTERNAL MEDICINE

## 2019-02-09 PROCEDURE — 92526 ORAL FUNCTION THERAPY: CPT | Mod: GN | Performed by: SPEECH-LANGUAGE PATHOLOGIST

## 2019-02-09 PROCEDURE — 40000225 ZZH STATISTIC SLP WARD VISIT: Performed by: SPEECH-LANGUAGE PATHOLOGIST

## 2019-02-09 PROCEDURE — 99239 HOSP IP/OBS DSCHRG MGMT >30: CPT | Performed by: INTERNAL MEDICINE

## 2019-02-09 RX ORDER — NYSTATIN 100000/ML
500000 SUSPENSION, ORAL (FINAL DOSE FORM) ORAL 4 TIMES DAILY
Qty: 200 ML | Refills: 0 | DISCHARGE
Start: 2019-02-09 | End: 2019-02-09

## 2019-02-09 RX ORDER — NYSTATIN 100000/ML
500000 SUSPENSION, ORAL (FINAL DOSE FORM) ORAL 4 TIMES DAILY
Qty: 600 ML | Refills: 0 | DISCHARGE
Start: 2019-02-09 | End: 2019-03-11

## 2019-02-09 RX ORDER — METOPROLOL TARTRATE 50 MG
50 TABLET ORAL 2 TIMES DAILY
Qty: 60 TABLET | Refills: 0 | DISCHARGE
Start: 2019-02-09 | End: 2019-03-11

## 2019-02-09 RX ADMIN — NYSTATIN 500000 UNITS: 500000 SUSPENSION ORAL at 08:14

## 2019-02-09 RX ADMIN — METOPROLOL TARTRATE 25 MG: 25 TABLET ORAL at 08:14

## 2019-02-09 RX ADMIN — DOCUSATE SODIUM 100 MG: 100 CAPSULE, LIQUID FILLED ORAL at 08:14

## 2019-02-09 ASSESSMENT — ACTIVITIES OF DAILY LIVING (ADL)
ADLS_ACUITY_SCORE: 24

## 2019-02-09 ASSESSMENT — MIFFLIN-ST. JEOR: SCORE: 1308.59

## 2019-02-09 NOTE — PROGRESS NOTES
Your information has been submitted on February 09th, 2019 at 12:00:57 PM CST. The confirmation number is [id number]

## 2019-02-09 NOTE — PLAN OF CARE
Heart Failure Care Pathway  GOALS TO BE MET BEFORE DISCHARGE:    1. Decrease congestion and/or edema with diuretic therapy to achieve near      optimal volume status.            Dyspnea improved:  Yes            Edema improved:     Yes        Net I/O and Weights since admission:          01/10 1500 - 02/09 1459  In: 1980 [P.O.:1980]  Out: 2650 [Urine:2650]  Net: -670            Vitals:    02/05/19 1032 02/08/19 0634 02/09/19 0606   Weight: 71.7 kg (158 lb) 65.1 kg (143 lb 9.6 oz) 65.4 kg (144 lb 3.2 oz)       2.  O2 sats > 92% on RA or at prior home O2 therapy level.          Current oxygenation status:       SpO2: 96 %         O2 Device: None (Room air),            Able to wean O2 this shift to keep sats > 92%:  Yes       Does patient use Home O2? No    3.  Tolerates ambulation and mobility near baseline: No, please explain:      How many times did the patient ambulate with nursing staff this shift? 0 ellis steady    Please review the Heart Failure Care Pathway for additional HF goal outcomes.    Shower today. Up in chair with ellis henson. Tolerating DD2 with honey thick liquid. Needs tray set up and chin tuck reminders. Plan to discharge to TCU today.    Emi Boyd RN  2/9/2019

## 2019-02-09 NOTE — DISCHARGE SUMMARY
"[facility]  Discharge Summary  Name: Sharath Valencia    MRN: [id number]  YOB: 1932    Age: 86 year old  Date of Discharge:  2/9/2019  Date of Admission: 2/4/2019  Primary Care Provider: [facility]  Discharge Physician:  Arturo Sanchez MD  Discharging Service:  Hospitalist      Discharge Diagnosis:  Recurrent falls with low back pain with acute L1 fracture  Physical deconditioning  Ruled out for acute congestive heart failure  Atrial fibrillation chronic, taken off from Coumadin due to #1  Cognitive impairment, no evidence of encephalopathy  Dysphagia     Other Diagnosis:  History reviewed. No pertinent past medical history.       Discharge Disposition:  Discharged to nursing home     Allergies:  No Known Allergies     Discharge Medications:   Current Discharge Medication List      START taking these medications    Details   nystatin (MYCOSTATIN) 107399 UNIT/ML suspension Take 5 mLs (500,000 Units) by mouth 4 times daily for 10 days  Qty: 200 mL, Refills: 0    Associated Diagnoses: Candidiasis of mouth         CONTINUE these medications which have NOT CHANGED    Details   metoprolol tartrate (LOPRESSOR) 25 MG tablet Take 25 mg by mouth 2 times daily         STOP taking these medications       warfarin (COUMADIN) 5 MG tablet Comments:   Reason for Stopping:         warfarin (COUMADIN) 5 MG tablet Comments:   Reason for Stopping:                Condition on Discharge:  Discharge condition: Stable   Discharge vitals: Blood pressure 106/55, pulse 124, temperature 98.2  F (36.8  C), temperature source Oral, resp. rate 18, height 1.727 m (5' 8\"), weight 65.4 kg (144 lb 3.2 oz), SpO2 96 %.   Code status on discharge: DNR / DNI     History of Present Illness:  See detailed admission note for full details.        Significant Physical Exam Findings Day of Discharge:  HEENT; Atraumatic, normocephalic, pinkish conjuctiva, pupils bilateral reactive   Skin: warm and moist, no " rashes  Lungs: equal chest expansion, clear to auscultation, no wheezes, no stridor, no crackles,   Heart: normal rate, normal rhythm, no rubs or gallops.   Abdomen: normal bowel sounds, no tenderness, no peritoneal signs, no guarding  Extremities: no deformities, back brace seen  Neuro; follow commands, alert and oriented x3, spontaneous speech, coherent, moves all extremities spontaneously  Psych; no hallucination, euthymic mood, not agitated        Procedures other than Imaging:  none     Imaging:  Results for orders placed or performed during the hospital encounter of 02/04/19   XR Chest 2 Views    Narrative    CHEST TWO VIEWS 2/4/2019 3:27 PM     HISTORY: Fall, evaluate trauma.    COMPARISON: None.    FINDINGS: The heart is enlarged and there is interstitial thickening.  No pneumothorax or pleural effusion. No displaced rib fracture  identified.       Impression    IMPRESSION: Radiographic findings concerning for CHF. Chronic  cardiomegaly and pulmonary fibrosis could have a similar appearance.    BERONICA BUCHANAN MD   CT Head w/o Contrast    Narrative    CT SCAN OF THE HEAD WITHOUT CONTRAST   2/4/2019 2:54 PM     HISTORY: See the clinical information for interpreting provider. Fall,  on coumadin.    TECHNIQUE:  Axial images of the head and coronal reformations without  IV contrast material.  Radiation dose for this scan was reduced using  automated exposure control, adjustment of the mA and/or kV according  to patient size, or iterative reconstruction technique.    COMPARISON: 10/31/2017.    FINDINGS: Moderate cerebral atrophy is present. Moderately extensive  white matter changes are seen in both hemispheres without mass effect.  There is no evidence for intracranial hemorrhage, mass effect, acute  infarct, or skull fracture. Visualized paranasal sinuses and mastoid  air cells are clear.      Impression    IMPRESSION: Chronic changes. No evidence for intracranial hemorrhage  or any acute process.    TEETEE  MD BENITA   CT Cervical Spine w/o Contrast    Narrative    CT CERVICAL SPINE WITHOUT CONTRAST   2/4/2019 2:55 PM     HISTORY: See the Clinical Information for Interpreting Provider; fall,  evaluate fracture.     TECHNIQUE: Axial images of the cervical spine were obtained without  intravenous contrast. Multiplanar reformations were performed.  Radiation dose for this scan was reduced using automated exposure  control, adjustment of the mA and/or kV according to patient size, or  iterative reconstruction technique.     COMPARISON: None.    FINDINGS: Sagittal reconstructions demonstrate minimal retrolisthesis  of C6 on C7 of approximately 2 mm, otherwise normal posterior  alignment. There is no evidence for any acute fracture. Multilevel  degenerative disc and facet disease is present with mild or  mild-to-moderate central canal stenoses at several levels. Paraspinal  soft tissues are unremarkable except for some vascular calcifications.  There is extensive peripherally calcified pannus formation at the  atlantoaxial joint, but there is no significant stenosis.      Impression    IMPRESSION: Degenerative changes. No evidence for fracture.    TEETEE JOY MD   Lumbar spine CT w/o contrast    Narrative    CT LUMBAR SPINE WITHOUT CONTRAST 2/4/2019 2:55 PM     HISTORY: Fall, pain, evaluate fracture.    TECHNIQUE: Axial images were obtained through the lumbar spine without  contrast. Coronal and sagittal reconstructions were also acquired.    COMPARISON: None.    FINDINGS: Five lumbar-type vertebral bodies are present. There is an  acute fracture involving the L1 vertebral segment with approximately  20% loss of height. There is no posterior retropulsion. No other  fractures are present. Disc space narrowing is present at L4-L5 and  L5-SI.    T12-L1: Normal.    L1-l2: Minimal disc bulging. No stenosis.    L2-L3: Broad-based disc bulge and mild facet and ligamentum flavum  hypertrophy is present but there is no significant  stenosis.    L3-L4: Broad-based disc bulging and mild facet and ligamentum flavum  hypertrophy is present causing some mild bilateral neural foraminal  stenosis and mild central canal stenosis.    L4-L5: Posterior osteophyte formation and facet hypertrophy is present  causing some moderate bilateral neural foraminal stenosis but no  central canal stenosis. There has also been a left-sided  hemilaminectomy at this level.    L5-S1: Moderate facet hypertrophy and minimal disc bulging is present  causing mild central and mild bilateral neural foraminal stenosis.      Impression    IMPRESSION:  1. Acute L1 vertebral body fracture without any evidence for any  retropulsion or stenosis.  2. Multilevel degenerative disc and facet disease most advanced at  L4-L5 where there is moderate bilateral neural foraminal stenosis.  3. Previous left-sided hemilaminectomy at L5.    TEETEE JOY MD   XR Pelvis w Hip Right 1 View    Narrative    PELVIS WITH RIGHT HIP LATERAL TWO VIEWS 2/4/2019 3:27 PM     HISTORY: Fall, tenderness, evaluate fracture.    COMPARISON: None.      Impression    IMPRESSION: There is medial joint space loss at the right hip with  coxa profunda compatible with degenerative change. There are prominent  trabecular lines along the ilioischial line on the right of doubtful  significance. No acute fracture is demonstrated. No dislocation.    BERONICA BUCHANAN MD        Consultations:  Consultation during this admission received from cardiology and neuro spine service.     Recent Lab Results:  Recent Labs   Lab 02/08/19  0615 02/07/19  0641 02/05/19  0628   WBC 9.6 11.1* 13.2*   HGB 11.8* 11.9* 11.7*   HCT 37.1* 35.4* 36.3*   MCV 98 97 97   * 134* 105*     No results for input(s): CULT in the last 168 hours.  Recent Labs   Lab 02/08/19  0615 02/07/19  0641 02/06/19  0750  02/04/19  1850    141 138   < >  --    POTASSIUM 4.0 3.8 3.5   < >  --    CHLORIDE 107 107 107   < >  --    CO2 29 27 24   < >  --     ANIONGAP 4 7 7   < >  --    GLC 86 99 123*   < >  --    BUN 21 25 28   < >  --    CR 0.80 0.75 0.79   < >  --    GFRESTIMATED 81 83 81   < >  --    GFRESTBLACK >90 >90 >90   < >  --    CUCA 7.9* 8.1* 8.2*   < >  --    MAG  --   --   --   --  2.3    < > = values in this interval not displayed.     Recent Labs   Lab 02/08/19  0615 02/07/19  0641 02/06/19  0750 02/05/19  0628 02/04/19  1305   GLC 86 99 123* 108* 115*     No results for input(s): LACT in the last 168 hours.  Recent Labs   Lab 02/05/19  0243 02/04/19  2230 02/04/19  1850   TROPI 0.096* 0.106* 0.113*     Recent Labs   Lab 02/04/19  1408   COLOR Yellow   APPEARANCE Clear   URINEGLC >499*   URINEBILI Negative   URINEKETONE 5*   SG 1.024   UBLD Negative   URINEPH 5.0   PROTEIN 100*   NITRITE Negative   LEUKEST Negative   RBCU 6*   WBCU 1          Pending Results:    Unresulted Labs Ordered in the Past 30 Days of this Admission     No orders found from 12/6/2018 to 2/5/2019.           Discharge Instructions and Follow-Up:   Discharge diet: Orders Placed This Encounter      Combination Diet Dysphagia Diet Level 2: Mechan Altered; Honey Thickened Liquids (pre-thickened or use instant food thickener); 2 gm NA Diet      Advance Diet as Tolerated     Discharge activity: Activity as tolerated   Discharge follow-up: 1-2 weeks with PCP, with spine service as scheduled   Outpatient therapy: None    Other instructions:  PT, OT, SLP     Hospital Course:  Continuing care today and Sharath has no significant reported events overnight.  He is tolerating oral diet with diet recommendations care of SLP.  Remain afebrile.  Stable hemodynamics.  No reported bleeding tendencies.  Not agitated, not combative.  No reported nausea, vomiting or diarrhea.  We will proceed with planned skilled setting discharge.  Taken off from Coumadin given recurrent fall events.    See excerpts of my prior progress notes for other details of his hospitalization stay.  Summary of Stay: I have utilize  my colleagues prior progress notes for other details as I assume care last February 8, 2019.  Summary of Stay: Sharath Valencia is a 86 year old male with PMH including atrial fibrillation on Coumadin who presents with multiple recent falls including 5 days ago, 3 days ago and then last night when he tried to get up and go to the bathroom.  Following his fall 3 days prior to hospitalization  he developed lower back pain.  He denies losing consciousness or passing out and these were apparently felt to be mechanical falls.      In the emergency room he was normotensive and afebrile.  He was initially tachycardic to 108 and was noted to be in atrial fibrillation with an incomplete right bundle branch block on EKG.  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 Chest x-ray showed cardiomegaly as well as pulmonary congestion versus fibrosis.  Lab workup was notable for an elevated troponin of 0.103, therapeutic INR of 2.91 and elevated BNP of 7306.  White blood count was also elevated at 13.9.  Urine analysis was notable for glucose urea, microscopic proteinuria and a few bacteria but otherwise negative.  He was given IV Lasix 20 mg and an aspirin and admitted for further management.     Assessment and Plan:   1.  Possible acute congestive heart failure: His last echo from 2017 showed normal EF but is elevated BNP, elevated troponin and chest x-ray concerning for some vascular prominence all are suggestive to congestive heart failure on presentation.  He may have developed a cardiomyopathy related to his Afib and inadequate rate control or alternately given his elevated troponin could have obstructive coronary disease but this seems less likely.  Appears euvolemic so diuretics were discontinued on 2/6/19.  --serial troponins relatively stable  --unchanged and grossly normal echocardiogram  --intake/output and daily weights  --cardiology  consult appreciated and earlier signed off     2.   Falls with low back pain found to have an acute L1 fracture: Suspect mechanical based on his description but cannot rule out syncope completely. 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  His pain seems to be actually lower than L1.  Will consult neurosurgery for further evaluation and management.  He is neurologically intact.    --neurosurgery consult this admission appreciated. Should wear TLSO brace at all times when out of bed. Follow with neurosurgery clinic in 6 weeks.  --PT/OT/SW  --needs TCU      3.  Atrial fibrillation on coumadin: Appears to have been diagnosed in 2017 during an admit for syncope.  Chronically on metoprolol and Coumadin.    EKG does show A. Fib.  Heart rate seems above goal at this time.  --Decisions made earlier to stop Coumadin due to frequent falling  --continue metoprolol to 50 BID due to inadequate rate control     4.  Leukocytosis: Denies recent infectious symptoms.  Most likely stress response.  Trending down.  Undetectable procalcitonin.     5.  Elevated troponin: It sounds as though his exercise capacity has certainly been diminishing and he is had a lot of dyspnea on exertion and sleeping up in a recliner but no jimmie chest pressure or chest pain. Will hold off on ischemic evaluation at this time.     6.  Encephalopathy vs chronic cognitive impairment: Appears as though he has waxing waning mental status at baseline consistent with some degree of dementia.   Neurologically fully intact.  TSH was normal.     7.  Reportedly with dysphagia and aspiration-like symptoms-we will await further SLP input for further optimization regarding his diet            Total time spent in face to face contact with the patient and coordinating discharge was:  > 30  Minutes.

## 2019-02-09 NOTE — PROGRESS NOTES
Data: SWS has been following to coordinate discharge to TCU. Pt has been accepted at [facility] and has an order for today.  Assessment/Action: SW called to inform [facility] and sent the discharge order to [facility]. SW called and informe pt's daughter Dianne.   Plan: Pt will discharge to [facility] today at 2:30 via HE.     PABLO May  Casual SW [id number]

## 2019-02-09 NOTE — PHARMACY-ANTICOAGULATION SERVICE
Clinical Pharmacy- Warfarin Discharge Note    Anticoagulation Dose History     Recent Dosing and Labs Latest Ref Rng & Units 11/3/2017 2/4/2019 2/5/2019 2/6/2019 2/7/2019 2/8/2019 2/9/2019    INR 0.86 - 1.14 1.18(H) 2.91(H) 4.05(H) 3.39(H) 2.80(H) 2.15(H) 1.72(H)          Warfarin was discontinued due to frequent falls.

## 2019-02-09 NOTE — PLAN OF CARE
Discharge Planner SLP   Patient plan for discharge: TCU   Current status: Pt seen for dysphagia treatment.  Pt positioned upright in bed gently due to back pain.  Pt did not remember need to chin tuck with honey texture liquids.  Trained on chin tuck strategy this session with honey texture liquids on spoon.  Discussed strategy of gently moving his chin up and down for chin tuck with nursing due to dementia and memory issues.  Nursing to use strategy with pt.  He also must double swallow with bites of food.   1:1 due to need for help with tactile cues for double swallows and chin tuck.  Up to chair for all meals, small bites and sips.  Crush meds in puree.  Barriers to return to prior living situation: weakness, confusion  Recommendations for discharge: TCU  Rationale for recommendations: For safe oral intake with diet modifications, use of safe swallow strategies and pharyngeal exercises.       Entered by: Nahid Cedeno 02/09/2019 10:54 AM      Speech Language Therapy Discharge Summary    Reason for therapy discharge:    Discharged to transitional care facility.    Progress towards therapy goal(s). See goals on Care Plan in [facility] electronic health record for goal details.  Goals partially met.  Barriers to achieving goals:   discharge from facility.    Therapy recommendation(s):    Continued therapy is recommended.  Rationale/Recommendations:  Needs diet modifications on DD2/honey texture., chin tuck on liquid, double swallow solids, pharyngeal exercises.

## 2019-02-09 NOTE — PLAN OF CARE
Heart Failure Care Pathway  GOALS TO BE MET BEFORE DISCHARGE:    1. Decrease congestion and/or edema with diuretic therapy to achieve near      optimal volume status.            Dyspnea improved:  Yes            Edema improved:     Yes        Net I/O and Weights since admission:          01/10 0700 - 02/09 0659  In: 1740 [P.O.:1740]  Out: 2450 [Urine:2450]  Net: -710            Vitals:    02/05/19 1032 02/08/19 0634   Weight: 71.7 kg (158 lb) 65.1 kg (143 lb 9.6 oz)       2.  O2 sats > 92% on RA or at prior home O2 therapy level.          Current oxygenation status:       SpO2: 95 %         O2 Device: None (Room air),            Able to wean O2 this shift to keep sats > 92%:  NA       Does patient use Home O2? No    3.  Tolerates ambulation and mobility near baseline: No, please explain: ellis steady with brace on when up         How many times did the patient ambulate with nursing staff this shift? 0    Please review the Heart Failure Care Pathway for additional HF goal outcomes.    Aide Quiroga RN  2/9/2019            VS stable. Diminished LS with non productive cough. Alert x 2. No complaints of pain. Tele is a fib RVR. Slept on and off throughout the night.

## 2019-02-10 NOTE — PLAN OF CARE
"  Physical Therapy Discharge Summary    Reason for therapy discharge:    Discharged to transitional care facility.    Progress towards therapy goal(s). See goals on Care Plan in [facility] electronic health record for goal details.  Goals not met.  Barriers to achieving goals:   limited tolerance for therapy and discharge from facility.  Pt made some progress with PT, last seen on 2/7:    \"Discharge Planner PT   Patient plan for discharge: TCU  Current status: Patient seated in bed following commode transfer with RNs. Pt agreeable to working with therapy. Pt was assisted with TLSO appropriately, poor retention from yesterday re:spinal precautions.  Pt was cued for sit to stand, FWW, min A. Pt was cued for ambulation, several pauses, and difficulty managing walker. Pt declined attempt to ambulate out of room, reporting fatigue and pain. Pt was able to ambulate 10 feet in room. Pt reporting minimal pain increases with ambulation and standing, although again frequent breaks. Pt poor posture, manual cues for posture correction. Pt was cued for return to supine as declined sitting up in bedside chair. Pt able to perform with cues for spinal prec and min/mod A.   Barriers to return to prior living situation: high fall risk, lives alone, requires assist for limited mobility  Recommendations for discharge:TCU  Rationale for recommendations: Patient presenting below baseline for functional mobility.  Patient currently unable to complete transfers at bed, chair or toilet without assistance.  Patient would benefit from ongoing physical therapy in order to increase strength, balance and independence with mobility.\"      Therapy recommendation(s):    Continued therapy is recommended.  Rationale/Recommendations:  Continue with therapy at TCU to regain strength and functional mobility status prior to returning home.    "

## 2019-02-11 ENCOUNTER — RECORDS - HEALTHEAST (OUTPATIENT)
Dept: LAB | Facility: CLINIC | Age: 84
End: 2019-02-11

## 2019-02-11 NOTE — PROGRESS NOTES
Transition Communication Hand-off for Care Transitions to Next Level of Care Provider    Name: Sharath Valencia  : 1932  MRN #: [id number]  Primary Care Provider: [facility]     Primary Clinic: [address]  [facility] [address]  Primary Care Clinic Name: [facility]  Reason for Hospitalization:  Congestive heart failure, unspecified HF chronicity, unspecified heart failure type (H) [I50.9]  Admit Date/Time: 2019 12:28 PM  Discharge Date: 19  Payor Source: Payor: MEDICARE / Plan: MEDICARE / Product Type: Medicare /     Readmission Assessment Measure (DEB) Risk Score/category: AVERAGE           Reason for Communication Hand-off Referral: Admission diagnoses: CHF  Fragility  Difficulty understanding plan of care    Discharge Plan:       Concern for non-adherence with plan of care:   NO  Discharge Needs Assessment:  Needs      Most Recent Value   Equipment Currently Used at Home  none   Transitional Care  [facility] [phone]          Already enrolled in Tele-monitoring program and name of program:  na  Follow-up specialty is recommended: Yes    Follow-up plan:  No future appointments.    Any outstanding tests or procedures:        Radiology & Cardiology Orders     Future Labs/Procedures Complete By Expires    X-ray lumbar spine 2-3 views*  3/19/2019 (Approximate) 2019        Referrals     Future Labs/Procedures    Occupational Therapy Adult Consult     Comments:    Evaluate and treat as clinically indicated.    Reason: Deconditioning, recurrent falls    Physical Therapy Adult Consult     Comments:    Evaluate and treat as clinically indicated.    Reason: Deconditioning, recurrent falls    Speech Language Path Adult Consult     Comments:    Evaluate and treat as clinically indicated.    Reason: Dysphagia                   Activity instructions     - continue to wear TLSO at all times when out of bed  - Repeat XR of lumbar spine AP and  Lateral in 6 weeks.   - Return to clinic following repeat XR spine in 6 weeks   - Call the [facility] clinic for any questions or concerns during interim, at [phone]  - Seek medical attention immediately if any paresthesias, weakness, gait instability, bowel/bladder incontinence, new pain.   As per spine service instructions      Diet instructions     Follow this diet upon discharge: Orders Placed This Encounter      Combination Diet Dysphagia Diet Level 2: Mechan Altered; Honey Thickened Liquids (pre-thickened or use instant food thickener); 2 gm NA Diet     Follow Up and recommended labs and tests    Follow up with Nursing home physician.  No follow up labs or test are needed.   As per follow-up with spine service       Follow-up and recommended labs and tests     Please follow up at the [facility] in 6 weeks with xray prior.  Please call the clinic at [phone] to schedule your appointment.             Key Recommendations:  CTS following for elevated BNP. Pt was admitted with falls and found to have an L1 fracture. He has been fitted with a TLSO brace. His BNP on admit was 7306 and he is being followed by cardiology. He is being treated with IV lasix. PT/OT are recommending TCU on discharge. Pt is having intermittent periods of confusion and is not appropriate for CHF teaching at this time. Plan for pt to discharge to [facility] TCU. Recommend CHF education in clinic when patients mentation clears.        Merly Silver    AVS/Discharge Summary is the source of truth; this is a helpful guide for improved communication of patient story

## 2019-02-12 ENCOUNTER — RECORDS - HEALTHEAST (OUTPATIENT)
Dept: LAB | Facility: CLINIC | Age: 84
End: 2019-02-12

## 2019-02-12 LAB
ALBUMIN UR-MCNC: ABNORMAL MG/DL
AMORPH CRY #/AREA URNS HPF: ABNORMAL /[HPF]
APPEARANCE UR: ABNORMAL
BACTERIA #/AREA URNS HPF: ABNORMAL HPF
BILIRUB UR QL STRIP: NEGATIVE
COLOR UR AUTO: YELLOW
GLUCOSE UR STRIP-MCNC: ABNORMAL MG/DL
HGB UR QL STRIP: NEGATIVE
KETONES UR STRIP-MCNC: NEGATIVE MG/DL
LEUKOCYTE ESTERASE UR QL STRIP: ABNORMAL
MUCOUS THREADS #/AREA URNS LPF: ABNORMAL LPF
NITRATE UR QL: NEGATIVE
PH UR STRIP: 6 [PH] (ref 4.5–8)
RBC #/AREA URNS AUTO: ABNORMAL HPF
SP GR UR STRIP: 1.02 (ref 1–1.03)
SQUAMOUS #/AREA URNS AUTO: ABNORMAL LPF
UROBILINOGEN UR STRIP-ACNC: ABNORMAL
WBC #/AREA URNS AUTO: ABNORMAL HPF

## 2019-02-13 LAB
BACTERIA SPEC CULT: NO GROWTH
ERYTHROCYTE [DISTWIDTH] IN BLOOD BY AUTOMATED COUNT: 14.4 % (ref 11–14.5)
HCT VFR BLD AUTO: 37.2 % (ref 40–54)
HGB BLD-MCNC: 12.2 G/DL (ref 14–18)
MCH RBC QN AUTO: 31.5 PG (ref 27–34)
MCHC RBC AUTO-ENTMCNC: 32.8 G/DL (ref 32–36)
MCV RBC AUTO: 96 FL (ref 80–100)
PLATELET # BLD AUTO: 210 THOU/UL (ref 140–440)
PMV BLD AUTO: 13.8 FL (ref 8.5–12.5)
RBC # BLD AUTO: 3.87 MILL/UL (ref 4.4–6.2)
WBC: 11.4 THOU/UL (ref 4–11)

## 2019-02-14 ENCOUNTER — RECORDS - HEALTHEAST (OUTPATIENT)
Dept: LAB | Facility: CLINIC | Age: 84
End: 2019-02-14

## 2019-02-15 LAB
ANION GAP SERPL CALCULATED.3IONS-SCNC: 6 MMOL/L (ref 5–18)
BUN SERPL-MCNC: 19 MG/DL (ref 8–28)
CALCIUM SERPL-MCNC: 8.9 MG/DL (ref 8.5–10.5)
CHLORIDE BLD-SCNC: 106 MMOL/L (ref 98–107)
CO2 SERPL-SCNC: 28 MMOL/L (ref 22–31)
CREAT SERPL-MCNC: 0.74 MG/DL (ref 0.7–1.3)
GFR SERPL CREATININE-BSD FRML MDRD: >60 ML/MIN/1.73M2
GLUCOSE BLD-MCNC: 89 MG/DL (ref 70–125)
POTASSIUM BLD-SCNC: 3.9 MMOL/L (ref 3.5–5)
SODIUM SERPL-SCNC: 140 MMOL/L (ref 136–145)

## 2019-02-26 ENCOUNTER — RECORDS - HEALTHEAST (OUTPATIENT)
Dept: LAB | Facility: CLINIC | Age: 84
End: 2019-02-26

## 2019-02-27 LAB
ALBUMIN SERPL-MCNC: 2.7 G/DL (ref 3.5–5)
ALP SERPL-CCNC: 107 U/L (ref 45–120)
ALT SERPL W P-5'-P-CCNC: 13 U/L (ref 0–45)
AST SERPL W P-5'-P-CCNC: 16 U/L (ref 0–40)
BILIRUB DIRECT SERPL-MCNC: 0.3 MG/DL
BILIRUB SERPL-MCNC: 0.6 MG/DL (ref 0–1)
PROT SERPL-MCNC: 6.4 G/DL (ref 6–8)

## 2019-02-28 ENCOUNTER — RECORDS - HEALTHEAST (OUTPATIENT)
Dept: LAB | Facility: CLINIC | Age: 84
End: 2019-02-28

## 2019-02-28 LAB
ALBUMIN UR-MCNC: ABNORMAL MG/DL
APPEARANCE UR: CLEAR
BACTERIA #/AREA URNS HPF: ABNORMAL HPF
BILIRUB UR QL STRIP: NEGATIVE
CAOX CRY #/AREA URNS HPF: PRESENT /[HPF]
COLOR UR AUTO: YELLOW
GLUCOSE UR STRIP-MCNC: ABNORMAL MG/DL
HGB UR QL STRIP: NEGATIVE
HYALINE CASTS #/AREA URNS LPF: ABNORMAL LPF
KETONES UR STRIP-MCNC: NEGATIVE MG/DL
LEUKOCYTE ESTERASE UR QL STRIP: NEGATIVE
MUCOUS THREADS #/AREA URNS LPF: ABNORMAL LPF
NITRATE UR QL: NEGATIVE
PH UR STRIP: 7 [PH] (ref 4.5–8)
RBC #/AREA URNS AUTO: ABNORMAL HPF
SP GR UR STRIP: 1.02 (ref 1–1.03)
SQUAMOUS #/AREA URNS AUTO: ABNORMAL LPF
UROBILINOGEN UR STRIP-ACNC: ABNORMAL
WBC #/AREA URNS AUTO: ABNORMAL HPF